# Patient Record
Sex: FEMALE | Race: WHITE | Employment: OTHER | URBAN - METROPOLITAN AREA
[De-identification: names, ages, dates, MRNs, and addresses within clinical notes are randomized per-mention and may not be internally consistent; named-entity substitution may affect disease eponyms.]

---

## 2018-06-20 ENCOUNTER — HOSPITAL ENCOUNTER (OUTPATIENT)
Dept: OCCUPATIONAL THERAPY | Age: 73
Setting detail: THERAPIES SERIES
Discharge: HOME | End: 2018-06-20
Payer: MEDICARE

## 2018-06-20 PROCEDURE — 97110 THERAPEUTIC EXERCISES: CPT | Performed by: OCCUPATIONAL THERAPIST

## 2018-06-20 PROCEDURE — 97140 MANUAL THERAPY 1/> REGIONS: CPT | Performed by: OCCUPATIONAL THERAPIST

## 2018-06-20 PROCEDURE — 97165 OT EVAL LOW COMPLEX 30 MIN: CPT | Performed by: OCCUPATIONAL THERAPIST

## 2018-06-20 PROCEDURE — G8984 CARRY CURRENT STATUS: HCPCS | Performed by: OCCUPATIONAL THERAPIST

## 2018-06-20 PROCEDURE — G8985 CARRY GOAL STATUS: HCPCS | Performed by: OCCUPATIONAL THERAPIST

## 2018-06-25 ENCOUNTER — HOSPITAL ENCOUNTER (OUTPATIENT)
Dept: OCCUPATIONAL THERAPY | Age: 73
Setting detail: THERAPIES SERIES
Discharge: HOME | End: 2018-06-25
Payer: MEDICARE

## 2018-06-25 PROCEDURE — 97110 THERAPEUTIC EXERCISES: CPT

## 2018-06-25 PROCEDURE — 97022 WHIRLPOOL THERAPY: CPT

## 2018-06-25 PROCEDURE — 97140 MANUAL THERAPY 1/> REGIONS: CPT

## 2018-06-25 PROCEDURE — 97760 ORTHOTIC MGMT&TRAING 1ST ENC: CPT

## 2018-06-27 ENCOUNTER — HOSPITAL ENCOUNTER (OUTPATIENT)
Dept: OCCUPATIONAL THERAPY | Age: 73
Setting detail: THERAPIES SERIES
Discharge: HOME | End: 2018-06-27
Payer: MEDICARE

## 2018-07-02 ENCOUNTER — HOSPITAL ENCOUNTER (OUTPATIENT)
Dept: OCCUPATIONAL THERAPY | Age: 73
Setting detail: THERAPIES SERIES
Discharge: HOME | End: 2018-07-02
Payer: MEDICARE

## 2018-07-02 PROCEDURE — 97530 THERAPEUTIC ACTIVITIES: CPT

## 2018-07-02 PROCEDURE — 97140 MANUAL THERAPY 1/> REGIONS: CPT

## 2018-07-02 PROCEDURE — 97110 THERAPEUTIC EXERCISES: CPT

## 2018-07-02 PROCEDURE — 97022 WHIRLPOOL THERAPY: CPT

## 2018-07-02 NOTE — PROGRESS NOTES
OCCUPATIONAL THERAPY PROGRESS NOTE      [facility] [facility]   Phone: [phone]   Fax: [phone]     Date:  2018  Initial Evaluation Date: 2018    Patient Name:  Elly Qureshi    :  1945    Restrictions/Precautions:  Per protocol, low fall risk  Diagnosis:  RUE Cmc arthroplasty                                                              Insurance/Certification information:  Medical mutual  Referring Physician:  Dr. Wyman Najjar  Date of Surgery/Injury: may 29th  Plan of care signed (Y/N):  no  Visit# / total visits: -    Pain Level: mild, aching    Subjective: \"I seem to have bruising from this splint. When does it come off. \"  Discussed home duties. Apparently she continues to do housekeeping chores and possibly working against the splint. She states she does a lot for her family. Objective:  Updated POC to be completed by 10th visit. Reassessment of splint fit done. Splint adjustment completed again due to complaints of pain to improve fit and comfort. INTERVENTION: COMPLETED REPS/TIME: SPECIFICS/COMMENTS:   Modality:      Fluidotherapy x 15 min    Paraffin      MHP      Estim      Manual Therapy:      Scar Massage x     Soft Tissue: x 10 min    Therapeutic Ex/Activities: Towel Task x     Prayer stretch x  Added to hep   AROM x  Per handout and adapted for HEP                                                                           Other:      Splint fit/fabricate x 20 min    Kinesio Tap x  Tape to incision area for pain and edema and scar reformation with wear/care instructions provided. Assessment: Pt is making Fair + progress toward stated plan of care.    -Rehab Potential: Good  -Requires OT Follow Up: Yes  -Time In: 10  -Time Out: 1110   Timed Treatment Minutes: 60 Minutes  Goals: Goals for pt can be see on initial eval occurring on 18  1) Patient will demonstrate good understanding of home

## 2018-07-03 ENCOUNTER — HOSPITAL ENCOUNTER (OUTPATIENT)
Dept: OCCUPATIONAL THERAPY | Age: 73
Setting detail: THERAPIES SERIES
Discharge: HOME | End: 2018-07-03
Payer: MEDICARE

## 2018-07-09 ENCOUNTER — HOSPITAL ENCOUNTER (OUTPATIENT)
Dept: OCCUPATIONAL THERAPY | Age: 73
Setting detail: THERAPIES SERIES
Discharge: HOME | End: 2018-07-09
Payer: MEDICARE

## 2018-07-09 PROCEDURE — 97530 THERAPEUTIC ACTIVITIES: CPT

## 2018-07-09 PROCEDURE — 97110 THERAPEUTIC EXERCISES: CPT

## 2018-07-09 PROCEDURE — 97140 MANUAL THERAPY 1/> REGIONS: CPT

## 2018-07-09 PROCEDURE — 97022 WHIRLPOOL THERAPY: CPT

## 2018-07-09 NOTE — PROGRESS NOTES
understanding of home program(exercises/activities/diagnosis/prognosis/goals) with good accuracy. 2) Patient will demonstrate increased active/passive range of motion of their R hand and wrist to [facility] for ADL/IADL completion. 3) Patient will demonstrate increased /pinch strength of at least 10 / 5 pinch pounds of their R hand, ( protocol permitting)  4) Patient to report decreased pain in their affected R hand from 3-5/10 to 0-2/10 or less with resistive functional use. 5) Patient to report 100% compliance with their splint wear, care, and precautions if needed. 6) Patient will be knowledgeable of edema control techniques as evident with decreases from mild to none. 7) Patient will demonstrate a non-tender/non-adherent scar and good tissue mobility. 8) Patient will report ADL functions same as prior to diagnosis of cmc arthroplasty.     OT G-codes:  Carrying, Handling, Moving objects   (Current status): Rin Gore (Discharge Goal): CI    Plan:   [x]  Continues Plan of care: Treatment delivered based on POC and graduated to patient's progress. Patient education continues at each visit to obtain maximum benefit from skilled OT intervention.   []  Alter Plan of care:   []  Discharge:    ASHLEY Bruno/SHAMEKA, [id number]

## 2018-07-11 ENCOUNTER — HOSPITAL ENCOUNTER (OUTPATIENT)
Dept: OCCUPATIONAL THERAPY | Age: 73
Setting detail: THERAPIES SERIES
Discharge: HOME | End: 2018-07-11
Payer: MEDICARE

## 2018-07-11 PROCEDURE — 97124 MASSAGE THERAPY: CPT

## 2018-07-11 PROCEDURE — G8984 CARRY CURRENT STATUS: HCPCS | Performed by: OCCUPATIONAL THERAPIST

## 2018-07-11 PROCEDURE — G8986 CARRY D/C STATUS: HCPCS | Performed by: OCCUPATIONAL THERAPIST

## 2018-07-11 PROCEDURE — 97110 THERAPEUTIC EXERCISES: CPT

## 2018-07-11 PROCEDURE — 97530 THERAPEUTIC ACTIVITIES: CPT

## 2018-07-11 PROCEDURE — 97022 WHIRLPOOL THERAPY: CPT

## 2018-07-11 PROCEDURE — G8985 CARRY GOAL STATUS: HCPCS | Performed by: OCCUPATIONAL THERAPIST

## 2018-07-11 NOTE — PROGRESS NOTES
OCCUPATIONAL THERAPY PROGRESS NOTE  DISCHARGE SUMMARY      [facility]   Phone: [phone]   Fax: [phone]     Date:  2018  Initial Evaluation Date: 2018    Patient Name:  Gisel Pruitt    :  1945    Restrictions/Precautions:  Per protocol, low fall risk, WBAT RUE(2018)  Diagnosis:  RUE Cmc arthroplasty                                                              Insurance/Certification information:  Medical mutual  Referring Physician:  Dr. Colbert So  Date of Surgery/Injury: may 29th  Plan of care signed (Y/N):  no  Visit# / total visits: -    Pain Level: none    Subjective:   Patient is requesting discharge due to back surgery next week. She has seen physician of record and he is in agreement with this as long as she has an established HEP. Objective:  Established a strengthening and stretching program for home use with handouts. Splint is now PRN if needed. She is aware of her precautions. She is now WBAT on her RUE. Reassessment completed. INTERVENTION: COMPLETED REPS/TIME: SPECIFICS/COMMENTS:   Modality:      Fluidotherapy x 15 min    Paraffin      MHP      Estim      Manual Therapy:      Scar Massage x     Soft Tissue: x 10 min    Therapeutic Ex/Activities: Towel Task x     Prayer stretch x  Added to hep   AROM x  Per handout and adapted for HEP   Putty   10 min    FM TASK x 10 min Deb, towel roll, nuts/bolts   Strengthening x putty Provided for HEP with handouts                                                   HEP x 20 min    Other:      Splint fit/fabricate      Kinesio Tap x  Tape to incision area for pain and edema and scar reformation with wear/care instructions provided. Assessment: Pt is making Fair + progress toward stated plan of care.    -Rehab Potential: Good  -Requires OT Follow Up: Yes  -Time In: 10  -Time Out: 1115   Timed Treatment Minutes: 75  Minutes  Goals: Goals for pt

## 2019-12-30 ENCOUNTER — HOSPITAL ENCOUNTER (OUTPATIENT)
Dept: OCCUPATIONAL THERAPY | Age: 74
Setting detail: THERAPIES SERIES
Discharge: HOME | End: 2019-12-30
Payer: MEDICARE

## 2019-12-30 PROCEDURE — 97110 THERAPEUTIC EXERCISES: CPT | Performed by: OCCUPATIONAL THERAPIST

## 2019-12-30 PROCEDURE — 97140 MANUAL THERAPY 1/> REGIONS: CPT | Performed by: OCCUPATIONAL THERAPIST

## 2019-12-30 PROCEDURE — 97165 OT EVAL LOW COMPLEX 30 MIN: CPT | Performed by: OCCUPATIONAL THERAPIST

## 2020-01-06 ENCOUNTER — HOSPITAL ENCOUNTER (OUTPATIENT)
Dept: OCCUPATIONAL THERAPY | Age: 75
Setting detail: THERAPIES SERIES
Discharge: HOME | End: 2020-01-06
Payer: MEDICARE

## 2020-01-06 PROCEDURE — 97022 WHIRLPOOL THERAPY: CPT

## 2020-01-06 PROCEDURE — 97530 THERAPEUTIC ACTIVITIES: CPT

## 2020-01-06 PROCEDURE — 97140 MANUAL THERAPY 1/> REGIONS: CPT

## 2020-01-06 NOTE — PROGRESS NOTES
[facility] THERAPY PROGRESS NOTE    [facility] [address] [facility]   Phone: [phone]   Fax: [phone]     Date:  2020 Initial Evaluation Date: 2019  Evaluating Therapist: Serafin Gardiner    Patient Name:  Chris Maria    :  1945  Restrictions/Precautions:  Thumb CMC Arthroplasty-4 weeks post-op, low fall risk  Diagnosis:  L [facility] [place] Arthroplasty                                                          Insurance/Certification information: Medicare Medical [place]  Referring Practitioner:  Kathy Giordano MD  Date of Surgery/Injury: 2019  Plan of care signed (Y/N):  N  Visit# / total visits:  if needed. Reassessment:    Pain Level: mild, aching and tight (pulling)    Subjective: \"I feel this one is different than the last surgery. Why does this one look different. \" Reassured patient that we are on track. If any questions regarding surgery she should ask Dr. Craig Rudolph. Objective:  Engaged patient in the following with focus on edema control, scar management, light AROM per protocol. Reassessment at or by 10th visit.    INTERVENTION: CPT Code Time Start - Time Stop COMPLETED REPS/TIME: SPECIFICS/COMMENTS:   Modality:          Fluidotherapy 82983 3:30 - 3:45 x 15 min With SROM as tolerated   Paraffin         MHP         Estim         Manual Therapy: 99673        Scar Massage  3:46 - 3:58 x 12 min To incision with patient education   Soft Tissue:  3:58 - 4:10 x 12 min Cross friction, MFR to area   PROM/AAROM:                                             Therapeutic Exercises:   34491        AROM                 STRETCHING  4:18-4:27 x 9 min Prayers as tolerated   STRENGTHENING                 Therapeutic Activity: 50717       Towel Task  4:10-4:18 x 10 reps Functional endurance tolerance of forearm and digits excluding thumb                                    Other:                                          Assessment: Patient shows potential to progress with stated goals and will be educated on HEP and provided written handouts as needed throughout their care. Pt is making Fair progress toward stated plan of care. -Rehab Potential: Good  -Requires OT Follow Up: Yes  Goal Progress:   1) Patient will demonstrate good understanding of home program (exercises/activities/diagnosis/prognosis/goals) with good accuracy. 2) Patient will demonstrate increased active/passive range of motion of their Left thumb to [facility] for proper ADL/IADL completion. 3) Patient will demonstrate increased /pinch strength of at least 10 / 5 pinch pounds of their L hand. 4) Increase in fine motor function as evidenced by decreased time to complete 9-hole peg test and/or MRMT test by at least 10 seconds. 5) Patient to report 100% compliance with their splint wear, care, and precautions if needed. 6) Patient to demonstrate decreased guarding of their affected extremity from 60% to 10% or less. 7) Patient will be knowledgeable of edema control techniques as evident with decreases from minimal to none. 8) Patient will demonstrate a non-tender/non-adherent scar. 9) Patient will report ADL functions same as prior to diagnosis of L CMC arthroplasty. 10) Patient will demonstrate increased active/passive range of motion of their Left wrist to [facility] for proper ADL/IADL completion.       Plan: Frequency/Duration 1-2 / week for 8-52 ZTHTGY if needed.    Certification period From: 12/30/2019  To: 3/16/2020    [x]  Continues Plan of care: Treatment delivered based on POC and graduated to patient's progress. Patient education continues at each visit to obtain maximum benefit from skilled OT intervention.   []  Alter Plan of care:   []  Discharge:    Billing:     TIME IN: 330   TIME OUT: 430 TOTAL TREATMENT TIME: 58 TOTAL TIME: 60    CODE  Time In Time Out TODAY MINUTES TODAY UNITS  TOTAL USED TOTAL ALLOWED   02218 Manual   24 2      21724 Therapeutic Ex   9 1

## 2020-01-08 ENCOUNTER — HOSPITAL ENCOUNTER (OUTPATIENT)
Dept: OCCUPATIONAL THERAPY | Age: 75
Setting detail: THERAPIES SERIES
Discharge: HOME | End: 2020-01-08
Payer: MEDICARE

## 2020-01-08 PROCEDURE — 97110 THERAPEUTIC EXERCISES: CPT

## 2020-01-08 PROCEDURE — 97530 THERAPEUTIC ACTIVITIES: CPT

## 2020-01-08 PROCEDURE — 97140 MANUAL THERAPY 1/> REGIONS: CPT

## 2020-01-08 PROCEDURE — 97022 WHIRLPOOL THERAPY: CPT

## 2020-01-08 NOTE — PROGRESS NOTES
[facility] THERAPY PROGRESS NOTE    [facility] [address] [facility]   Phone: [phone]   Fax: [phone]     Date:  2020 Initial Evaluation Date: 2019  Evaluating Therapist: Dwain Giordano    Patient Name:  Olinda Saint    :  1945  Restrictions/Precautions:  Thumb CMC Arthroplasty-4 weeks post-op, low fall risk  Diagnosis:  L [facility] [place] Arthroplasty                                                          Insurance/Certification information: Medicare Medical [place]  Referring Practitioner:  Michelle Peterson MD  Date of Surgery/Injury: 2019  Plan of care signed (Y/N):  N  Visit# / total visits: 3 / 18 if needed. Reassessment:    Pain Level: mild, aching and tight (pulling)    Subjective: \"I had a little bit more pain HS the day of my last session. \"  HEP in place and graded as needed. Objective:  Engaged patient in the following with focus on edema control, scar management, light AROM per protocol. Reassessment at or by 10th visit.    INTERVENTION: CPT Code Time Start - Time Stop COMPLETED REPS/TIME: SPECIFICS/COMMENTS:   Modality:          Fluidotherapy 93728 3:31 - 3:44 x 15 min With SROM as tolerated   Paraffin         MHP         Estim         Manual Therapy: 47401        Scar Massage  3:46 - 3:58 x 12 min To incision with patient education   Soft Tissue:  3:58 - 4:10 x 12 min Cross friction, MFR to area   PROM/AAROM:                                             Therapeutic Exercises:   59939        AROM                 STRETCHING  4:18-4:27 x 9 min Prayers as tolerated   Stretching  \" x \" included Web space           Therapeutic Activity: 75599       Towel Task  4:10-4:18 x 10 reps Functional endurance tolerance of forearm and digits excluding thumb                                    Other:                                          Assessment: Patient shows potential to progress with stated goals and will be educated on HEP and 39490 ADL/COMP Tech Train          45519 Neuromuscular Re-Ed          72949 OrthoManagementTraining           Modality:           Other                                           TOTAL    [address],  RAM[place]/L, [id number]

## 2020-01-13 ENCOUNTER — HOSPITAL ENCOUNTER (OUTPATIENT)
Dept: OCCUPATIONAL THERAPY | Age: 75
Setting detail: THERAPIES SERIES
Discharge: HOME | End: 2020-01-13
Payer: MEDICARE

## 2020-01-13 PROCEDURE — 97140 MANUAL THERAPY 1/> REGIONS: CPT

## 2020-01-13 PROCEDURE — 97022 WHIRLPOOL THERAPY: CPT

## 2020-01-13 PROCEDURE — 97530 THERAPEUTIC ACTIVITIES: CPT

## 2020-01-13 PROCEDURE — 97110 THERAPEUTIC EXERCISES: CPT

## 2020-01-13 NOTE — PROGRESS NOTES
[facility] THERAPY PROGRESS NOTE    [address] [place] [address]   Phone: [phone]   Fax: [phone]     Date:  2020 Initial Evaluation Date: 2019  Evaluating Therapist: Ursula Holter    Patient Name:  Francisco Javier Norton    :  1945  Restrictions/Precautions:  Thumb CMC Arthroplasty-4 weeks post-op, low fall risk  Diagnosis:  L [facility] [place] Arthroplasty                                                          Insurance/Certification information: Medicare Medical [place]  Referring Practitioner:  Arabella Medina MD  Date of Surgery/Injury: 2019  Plan of care signed (Y/N):  N  Visit# / total visits:  if needed. Reassessment:    Pain Level: mild, aching and tight (pulling)    Subjective:  Seen 1/2 scheduled visits. Little pain voiced when attempting to flatten hand. Able to carry groceries this week. Next physician's appointment is 1/15/2020. Objective:  Engaged patient in the following with focus on edema control, scar management, light AROM per protocol. Reassessment at or by 10th visit.    INTERVENTION: CPT Code Time Start - Time Stop COMPLETED REPS/TIME: SPECIFICS/COMMENTS:   Modality:          Fluidotherapy 78599 3:31 - 3:44 x 15 min With SROM as tolerated   Paraffin         MHP         Estim         Manual Therapy: 43924        Scar Massage  3:46 - 3:54 x 8 min To incision with patient education   Soft Tissue:  3:58 - 4:10 x 12 min Cross friction, MFR to area   PROM/AAROM:                                             Therapeutic Exercises:   55328        AROM                 STRETCHING  4:18-4:27 x 9 min Prayers as tolerated   Stretching  \" x \" included Web space           Therapeutic Activity: 45331       Towel Task  4:10-4:18 x 10 reps Functional endurance tolerance of forearm and digits excluding thumb                                    Other:                                          Assessment: Patient shows potential

## 2020-01-17 ENCOUNTER — HOSPITAL ENCOUNTER (OUTPATIENT)
Dept: OCCUPATIONAL THERAPY | Age: 75
Setting detail: THERAPIES SERIES
Discharge: HOME | End: 2020-01-17
Payer: MEDICARE

## 2020-01-17 PROCEDURE — 97140 MANUAL THERAPY 1/> REGIONS: CPT

## 2020-01-17 PROCEDURE — 97110 THERAPEUTIC EXERCISES: CPT

## 2020-01-17 PROCEDURE — 97022 WHIRLPOOL THERAPY: CPT

## 2020-01-17 PROCEDURE — 97530 THERAPEUTIC ACTIVITIES: CPT

## 2020-01-17 NOTE — PROGRESS NOTES
[facility] THERAPY PROGRESS NOTE    [address] [address] [place] [address]   Phone: [phone]   Fax: [phone]     Date:  2020 Initial Evaluation Date: 2019  Evaluating Therapist: Brady Bosworth    Patient Name:  Trinh Lombardo    :  1945  Restrictions/Precautions:  Thumb CMC Arthroplasty-4 weeks post-op, low fall risk  Diagnosis:  L [facility] [place] Arthroplasty                                                          Insurance/Certification information: Medicare Medical [place]  Referring Practitioner:  Sarah Brower MD  Date of Surgery/Injury: 2019  Plan of care signed (Y/N):  N  Visit# / total visits:  if needed. Reassessment:    Pain Level: mild, aching and tight (pulling)    Subjective:  Seen 2/2 scheduled visits. Little pain voiced when attempting to flatten hand. Able to carry groceries this week. Next physician's appointment is 1/15/2020. Objective:  Engaged patient in the following with focus on edema control, scar management, light AROM per protocol. Reassessment at or by 10th visit.    INTERVENTION: CPT Code Time Start - Time Stop COMPLETED REPS/TIME: SPECIFICS/COMMENTS:   Modality:          Fluidotherapy 10651 9:31 - 9:44 x 15 min With SROM as tolerated   Paraffin         MHP         Estim         Manual Therapy: 74096        Scar Massage  9:46 - 9:54 x 8 min To incision with patient education   Soft Tissue:  9:58 - 9:10 x 12 min Cross friction, MFR to area   PROM/AAROM:                                             Therapeutic Exercises:   51524        AROM                 STRETCHING  1027 - 1031 x 4 Prayers as tolerated   Stretching  \" x \" included Web space           Therapeutic Activity: 91221       Towel Task  10:10- 10:15 x 10 reps Functional endurance tolerance of forearm and digits excluding thumb   Deb pickup and hold  10 15 - 10 27 x                              Other:        Splint adjustment Assessment: Patient shows potential to progress with stated goals and will be educated on HEP and provided written handouts as needed throughout their care. Pt is making Fair progress toward stated plan of care. -Rehab Potential: Good  -Requires OT Follow Up: Yes  Goal Progress:   1) Patient will demonstrate good understanding of home program (exercises/activities/diagnosis/prognosis/goals) with good accuracy. 2) Patient will demonstrate increased active/passive range of motion of their Left thumb to [facility] for proper ADL/IADL completion. 3) Patient will demonstrate increased /pinch strength of at least 10 / 5 pinch pounds of their L hand. 4) Increase in fine motor function as evidenced by decreased time to complete 9-hole peg test and/or MRMT test by at least 10 seconds. 5) Patient to report 100% compliance with their splint wear, care, and precautions if needed. 6) Patient to demonstrate decreased guarding of their affected extremity from 60% to 10% or less. 7) Patient will be knowledgeable of edema control techniques as evident with decreases from minimal to none. 8) Patient will demonstrate a non-tender/non-adherent scar. 9) Patient will report ADL functions same as prior to diagnosis of L CMC arthroplasty. 10) Patient will demonstrate increased active/passive range of motion of their Left wrist to [facility] for proper ADL/IADL completion.     Plan: Frequency/Duration 1-2 / week for 2-56 TRYGGA if needed.    Certification period From: 12/30/2019  To: 3/16/2020    [x]  Continues Plan of care: Treatment delivered based on POC and graduated to patient's progress. Patient education continues at each visit to obtain maximum benefit from skilled OT intervention.   []  Alter Plan of care:   []  Discharge:    Billing:     TIME IN: 930   TIME OUT: 1035 TOTAL TREATMENT TIME: 60 TOTAL TIME: 65    CODE  Time In Time Out 100 Lisa Saldaña Manual   15 1      22674 Therapeutic Ex   10 1      58211 Therapeutic Activity   20 1      35312 Fluidotherapy   15 1      63806 ADL/COMP Tech Train          [id number] Neuromuscular Re-Ed          33382 OrthoManagementTraining           Modality:           Other                                           TOTAL    60 [address],  [place]/L, [address]

## 2020-01-20 ENCOUNTER — HOSPITAL ENCOUNTER (OUTPATIENT)
Dept: OCCUPATIONAL THERAPY | Age: 75
Setting detail: THERAPIES SERIES
Discharge: HOME | End: 2020-01-20
Payer: MEDICARE

## 2020-01-20 PROCEDURE — 97110 THERAPEUTIC EXERCISES: CPT

## 2020-01-20 PROCEDURE — 97022 WHIRLPOOL THERAPY: CPT

## 2020-01-20 PROCEDURE — 97140 MANUAL THERAPY 1/> REGIONS: CPT

## 2020-01-20 NOTE — PROGRESS NOTES
[facility] THERAPY PROGRESS NOTE    [facility] [address] [facility]   Phone: [phone]   Fax: [phone]     Date:  2020 Initial Evaluation Date: 2019  Evaluating Therapist: Junior Bello    Patient Name:  Alan Leigh    :  1945  Restrictions/Precautions:  Thumb CMC Arthroplasty-4 weeks post-op, low fall risk  Diagnosis:  L [facility] Arthroplasty                                                          Insurance/Certification information: Medicare Medical [place]  Referring Practitioner:  Geovany Lang MD  Date of Surgery/Injury: 2019  Plan of care signed (Y/N):  N  Visit# / total visits:  if needed. Reassessment:    Pain Level: mild, aching and tight (pulling)    Subjective:  Seen 1/2 scheduled visits. WEEK 7 of protocol Little pain voiced when attempting to flatten hand. Able to carry groceries this week. Next physician's appointment is 2020. Objective:  Engaged patient in the following with focus on edema control, scar management, light AROM per protocol. Reassessment at or by 10th visit. Week 7:  Gentle AROM, PROM TO CMC, Scar Mgmt, NON Weight Bearing, Use comfort cool brace discharge hard brace.     INTERVENTION: CPT Code Time Start - Time Stop COMPLETED REPS/TIME: SPECIFICS/COMMENTS:   Modality:          Fluidotherapy 97022  x 15 min With SROM as tolerated   Paraffin         MHP         Estim         Manual Therapy: 60069        Scar Massage   x 8 min To incision with patient education   Soft Tissue:   x 12 min Cross friction, MFR to area   PROM/AAROM:         PROM to [facility]   x 7 min     AROM to IP, CMC, MP   x 8 min                       Therapeutic Exercises:   92382        AROM                 STRETCHING   x 5 min Prayers as tolerated   Stretching   x \"   included Web space           Therapeutic Activity: 40939       Towel Task   x 4 min Functional endurance tolerance of forearm and digits excluding thumb   Deb pickup and hold   x 10 min    [employer] ball   x 6 min                     Other:        Splint adjustment                                  Assessment: Patient shows potential to progress with stated goals and will be educated on HEP and provided written handouts as needed throughout their care. Pt is making Fair progress toward stated plan of care. -Rehab Potential: Good  -Requires OT Follow Up: Yes  Goal Progress:   1) Patient will demonstrate good understanding of home program (exercises/activities/diagnosis/prognosis/goals) with good accuracy. 2) Patient will demonstrate increased active/passive range of motion of their Left thumb to [facility] for proper ADL/IADL completion. 3) Patient will demonstrate increased /pinch strength of at least 10 / 5 pinch pounds of their L hand. 4) Increase in fine motor function as evidenced by decreased time to complete 9-hole peg test and/or MRMT test by at least 10 seconds. 5) Patient to report 100% compliance with their splint wear, care, and precautions if needed. 6) Patient to demonstrate decreased guarding of their affected extremity from 60% to 10% or less. 7) Patient will be knowledgeable of edema control techniques as evident with decreases from minimal to none. 8) Patient will demonstrate a non-tender/non-adherent scar. 9) Patient will report ADL functions same as prior to diagnosis of L CMC arthroplasty. 10) Patient will demonstrate increased active/passive range of motion of their Left wrist to [facility] for proper ADL/IADL completion.     Plan: Frequency/Duration 1-2 / week for 0-44 IXSVAP if needed.    Certification period From: 12/30/2019  To: 3/16/2020    [x]  Continues Plan of care: Treatment delivered based on POC and graduated to patient's progress. Patient education continues at each visit to obtain maximum benefit from skilled OT intervention.   []  Alter Plan of care:   []  Discharge:    Billing:     TIME IN: 934   TBZN OUT: 1035 TOTAL TREATMENT TIME: 60 TOTAL TIME: 65    CODE  Time In Time Out TODAY MINUTES TODAY UNITS  TOTAL USED TOTAL ALLOWED   79727 Manual   35 2      46248 Therapeutic Ex          13622 Therapeutic Activity   15 1      62596 Fluidotherapy   10 1      31013 ADL/COMP Tech Train          48238 Neuromuscular Re-Ed          14886 OrthoManagementTraining           Modality:           Other                                           TOTAL    60 [address] [facility],  RAM[place]/L, [id number]

## 2020-01-22 ENCOUNTER — HOSPITAL ENCOUNTER (OUTPATIENT)
Dept: OCCUPATIONAL THERAPY | Age: 75
Setting detail: THERAPIES SERIES
Discharge: HOME | End: 2020-01-22
Payer: MEDICARE

## 2020-01-22 PROCEDURE — 97530 THERAPEUTIC ACTIVITIES: CPT

## 2020-01-22 PROCEDURE — 97022 WHIRLPOOL THERAPY: CPT

## 2020-01-22 PROCEDURE — 97140 MANUAL THERAPY 1/> REGIONS: CPT

## 2020-01-22 NOTE — PROGRESS NOTES
Functional endurance tolerance of forearm and digits excluding thumb   Cotton Ball Manipulation with \"O\" positioning of thumb and index   x 15    Colton Whall ball   x 6 min                     Other:        Splint adjustment                                  Assessment: Patient shows potential to progress with stated goals and will be educated on HEP and provided written handouts as needed throughout their care. Pt is making Fair progress toward stated plan of care. -Rehab Potential: Good  -Requires OT Follow Up: Yes  Goal Progress:   1) Patient will demonstrate good understanding of home program (exercises/activities/diagnosis/prognosis/goals) with good accuracy. 2) Patient will demonstrate increased active/passive range of motion of their Left thumb to [facility] for proper ADL/IADL completion. 3) Patient will demonstrate increased /pinch strength of at least 10 / 5 pinch pounds of their L hand. 4) Increase in fine motor function as evidenced by decreased time to complete 9-hole peg test and/or MRMT test by at least 10 seconds. 5) Patient to report 100% compliance with their splint wear, care, and precautions if needed. 6) Patient to demonstrate decreased guarding of their affected extremity from 60% to 10% or less. 7) Patient will be knowledgeable of edema control techniques as evident with decreases from minimal to none. 8) Patient will demonstrate a non-tender/non-adherent scar. 9) Patient will report ADL functions same as prior to diagnosis of L CMC arthroplasty. 10) Patient will demonstrate increased active/passive range of motion of their Left wrist to [facility] for proper ADL/IADL completion.     Plan: Frequency/Duration 1-2 / week for 0-46 WDVVIR if needed.    Certification period From: 12/30/2019  To: 3/16/2020    [x]  Continues Plan of care: Treatment delivered based on POC and graduated to patient's progress.   Patient education continues at each visit to obtain maximum benefit from skilled OT intervention.   []  Alter Plan of care:   []  Discharge:    Billing:     TIME IN: [address]: 1035 TOTAL TREATMENT TIME: 60 TOTAL TIME: 65    CODE  Time In Time Out TODAY MINUTES TODAY UNITS  TOTAL USED TOTAL ALLOWED   59614 Manual   20 1      55832 Therapeutic Ex          21836 Therapeutic Activity   25 2      99676 Fluidotherapy   15 1      90106 ADL/COMP Tech Train          73091 Neuromuscular Re-Ed          27454 OrthoManagementTraining           Modality:           Other                                           TOTAL    60 1291 [facility] [address],  R Laura Bocanegra , [id number]

## 2020-01-27 ENCOUNTER — HOSPITAL ENCOUNTER (OUTPATIENT)
Dept: OCCUPATIONAL THERAPY | Age: 75
Setting detail: THERAPIES SERIES
Discharge: HOME | End: 2020-01-27
Payer: MEDICARE

## 2020-01-27 PROCEDURE — 97140 MANUAL THERAPY 1/> REGIONS: CPT

## 2020-01-27 PROCEDURE — 97110 THERAPEUTIC EXERCISES: CPT

## 2020-01-27 PROCEDURE — 97530 THERAPEUTIC ACTIVITIES: CPT

## 2020-01-27 PROCEDURE — 97022 WHIRLPOOL THERAPY: CPT

## 2020-01-27 NOTE — PROGRESS NOTES
STRETCHING   x 5 min Prayers as tolerated   Stretching   x \"   included Web space   Thumb strengthening   x 10 min Initiated with light putty pinch in 'O\" position   Therapeutic Activity: 49587       Towel Task   x 4 min Functional endurance tolerance of forearm and digits excluding thumb   Cotton Ball Manipulation with \"O\" positioning of thumb and index        Colton Whall ball   x 6 min    TIP to TIP pinch task   x 7min Maintaining 'O' position with multiple  and manipulate of small graded objects in hand over 7 min            Other:        Splint adjustment                                  Assessment: Patient shows potential to progress with stated goals and will be educated on HEP and provided written handouts as needed throughout their care. Pt is making Fair progress toward stated plan of care. -Rehab Potential: Good  -Requires OT Follow Up: Yes  Goal Progress:   1) Patient will demonstrate good understanding of home program (exercises/activities/diagnosis/prognosis/goals) with good accuracy. 2) Patient will demonstrate increased active/passive range of motion of their Left thumb to [facility] for proper ADL/IADL completion. 3) Patient will demonstrate increased /pinch strength of at least 10 / 5 pinch pounds of their L hand. 4) Increase in fine motor function as evidenced by decreased time to complete 9-hole peg test and/or MRMT test by at least 10 seconds. 5) Patient to report 100% compliance with their splint wear, care, and precautions if needed. 6) Patient to demonstrate decreased guarding of their affected extremity from 60% to 10% or less. 7) Patient will be knowledgeable of edema control techniques as evident with decreases from minimal to none. 8) Patient will demonstrate a non-tender/non-adherent scar. 9) Patient will report ADL functions same as prior to diagnosis of L CMC arthroplasty.   10) Patient will demonstrate increased active/passive range of motion of their Left wrist to [facility] for proper ADL/IADL completion.     Plan: Frequency/Duration 1-2 / week for 7-78 [id number] if needed.    Certification period From: 12/30/2019  To: 3/16/2020    [x]  Continues Plan of care: Treatment delivered based on POC and graduated to patient's progress. Patient education continues at each visit to obtain maximum benefit from skilled OT intervention.   []  Alter Plan of care:   []  Discharge:    Billing:     TIME IN: [address]: 1035 TOTAL TREATMENT TIME: 60 TOTAL TIME: 65    CODE  Time In Time Out TODAY MINUTES TODAY UNITS  TOTAL USED TOTAL ALLOWED   05135 Manual   13 1      21686 Therapeutic Ex   15 1      20724 Therapeutic Activity   17 1      64539 Fluidotherapy   15 1      48412 ADL/COMP Tech Train          86521 Neuromuscular Re-Ed          85016 OrthoManagementTraining           Modality:           Other                                           TOTAL    60 1291 [facility] [address]  R Laura [address]oc[address], [id number]

## 2020-01-29 ENCOUNTER — HOSPITAL ENCOUNTER (OUTPATIENT)
Dept: OCCUPATIONAL THERAPY | Age: 75
Setting detail: THERAPIES SERIES
Discharge: HOME | End: 2020-01-29
Payer: MEDICARE

## 2020-01-29 PROCEDURE — 97110 THERAPEUTIC EXERCISES: CPT

## 2020-01-29 PROCEDURE — 97022 WHIRLPOOL THERAPY: CPT

## 2020-01-29 PROCEDURE — 97140 MANUAL THERAPY 1/> REGIONS: CPT

## 2020-01-29 PROCEDURE — 97530 THERAPEUTIC ACTIVITIES: CPT

## 2020-01-29 NOTE — PROGRESS NOTES
[facility] THERAPY PROGRESS NOTE    [address] [place] [address]   Phone: [phone]   Fax: [phone]     Date:  2020 Initial Evaluation Date: 2019  Evaluating Therapist: Landon Almazan    Patient Name:  Miranda Sabillon    :  1945  Restrictions/Precautions:  Thumb CMC Arthroplasty-4 weeks post-op, low fall risk  Diagnosis:  L [facility] Arthroplasty                                                          Insurance/Certification information: Medicare / Medical [place]  Referring Practitioner:  Daysi Mackey MD  Date of Surgery/Injury: 2019  Plan of care signed (Y/N):    Visit# / total visits: -   Reassessment: DUE   Pain Level: mild, aching and tight (pulling)    Subjective:  Seen 2/2 scheduled visits. WEEK 8 of protocol. CC is limited ROM at thumb. She feels this one is not moving as steadily as the first thumb recovered. Pleased with comfort cool brace. Pain is mild to none at this time. Precautions with lifting and carrying completed with patient voicing understanding. Objective:  Engaged patient in the following with focus on edema control, scar management, light AROM per protocol. Reassessment at or by each 10th visit. Week 8:  Gentle thumb strengthening, Initiate HEP strengthening continue  AROM, PROM TO CMC, Scar Mgmt, NON Weight Bearing, Use comfort cool brace discharge hard brace. No ADLs with affected thumb at this time(week 10).   INTERVENTION: CPT Code COMPLETED REPS/TIME: SPECIFICS/COMMENTS:   Modality:         Fluidotherapy 97022 x 15 min With SROM as tolerated   Paraffin        MHP        Estim        Manual Therapy: 27821       Scar Massage  x 8 min To incision with patient education   Soft Tissue:  x 8 min Cross friction, MFR to area   PROM/AAROM:        PROM to [facility]  x 4 min                             Therapeutic Exercises:   86478       AROM               STRETCHING  x 5 min Prayers as tolerated   Stretching  x \"   included Web space          Therapeutic Activity: 94624      Towel Task  x 4 min Functional endurance tolerance of forearm and digits excluding thumb   Cotton Ball Manipulation with \"O\" positioning of thumb and index       Colton Whall ball  x 5 min    TIP to TIP pinch task  x 12 min Use of med putty and Maintaining 'O' position with multiple  and manipulate of small graded objects in hand over 7 min           Other:       Splint adjustment                              Assessment: Patient shows potential to progress with stated goals and will be educated on HEP and provided written handouts as needed throughout their care. Pt is making Fair progress toward stated plan of care. -Rehab Potential: Good  -Requires OT Follow Up: Yes  Goal Progress:   1) Patient will demonstrate good understanding of home program (exercises/activities/diagnosis/prognosis/goals) with good accuracy. 2) Patient will demonstrate increased active/passive range of motion of their Left thumb to [facility] for proper ADL/IADL completion. 3) Patient will demonstrate increased /pinch strength of at least 10 / 5 pinch pounds of their L hand. 4) Increase in fine motor function as evidenced by decreased time to complete 9-hole peg test and/or MRMT test by at least 10 seconds. 5) Patient to report 100% compliance with their splint wear, care, and precautions if needed. 6) Patient to demonstrate decreased guarding of their affected extremity from 60% to 10% or less. 7) Patient will be knowledgeable of edema control techniques as evident with decreases from minimal to none. 8) Patient will demonstrate a non-tender/non-adherent scar. 9) Patient will report ADL functions same as prior to diagnosis of L CMC arthroplasty.   10) Patient will demonstrate increased active/passive range of motion of their Left wrist to [facility] for proper ADL/IADL completion.     Plan: Frequency/Duration 1-2 / week for 8-92 [id number] if needed.    Certification period From: 12/30/2019  To: 3/16/2020    [x]  Continues Plan of care: Treatment delivered based on POC and graduated to patient's progress. Patient education continues at each visit to obtain maximum benefit from skilled OT intervention.   []  Alter Plan of care:   []  Discharge:    Billing:     TIME IN: [address] [facility]: 1035 TOTAL TREATMENT TIME: 60 TOTAL TIME: 65    CODE  Time In Time Out TODAY MINUTES TODAY UNITS  TOTAL USED TOTAL ALLOWED   60065 Manual   20 1      89685 Therapeutic Ex   10 1      10555 Therapeutic Activity   15 1      79112 Fluidotherapy   15 1      35943 ADL/COMP Tech Train          85611 Neuromuscular Re-Ed          37473 OrthoManagementTraining           Modality:           Other                                           TOTAL    60 1291 [facility] [address],  R Laura [address]oc[address], [id number]

## 2020-02-03 ENCOUNTER — HOSPITAL ENCOUNTER (OUTPATIENT)
Dept: OCCUPATIONAL THERAPY | Age: 75
Setting detail: THERAPIES SERIES
Discharge: HOME | End: 2020-02-03
Payer: MEDICARE

## 2020-02-03 PROCEDURE — 97530 THERAPEUTIC ACTIVITIES: CPT

## 2020-02-03 PROCEDURE — 97110 THERAPEUTIC EXERCISES: CPT

## 2020-02-03 PROCEDURE — 97140 MANUAL THERAPY 1/> REGIONS: CPT

## 2020-02-03 NOTE — PROGRESS NOTES
[facility] THERAPY PROGRESS NOTE    [address] [place] [address] [facility]   Phone: [phone]   Fax: [phone]     Date:  2/3/2020 Initial Evaluation Date: 2019  Evaluating Therapist: Lakhwinder Kulkarni    Patient Name:  Osmar Quinn    :  1945  Restrictions/Precautions:  Thumb CMC Arthroplasty-4 weeks post-op, low fall risk  Diagnosis:  L [facility] Arthroplasty                                                          Insurance/Certification information: Medicare / Medical [place]  Referring Practitioner:  Bahman Casper MD  Date of Surgery/Injury: 2019  Plan of care signed (Y/N):    Visit# / total visits: 10 / 12-   Reassessment: DUE   Pain Level: mild, aching and tight (pulling)    Subjective:  Seen 1/2 scheduled visits. WEEK 9 of protocol. CC is limited ROM at thumb and an aching feeling that she states she did not have in the prior hand. Pleased with comfort cool brace. Objective:  Engaged patient in the following with focus on edema control, scar management, light AROM per protocol. Reassessment at or by each 10th visit. Week 9:  Continue week 8. Gentle thumb strengthening, Initiate HEP strengthening continue  AROM, PROM TO CMC, Scar Mgmt, NON Weight Bearing, Use comfort cool brace discharge hard brace. No ADLs with affected thumb at this time(week 10).   INTERVENTION: CPT Code COMPLETED REPS/TIME: SPECIFICS/COMMENTS:   Modality:         Fluidotherapy 97022 x 12 min With SROM as tolerated and light isometric pinch   Paraffin        MHP        Estim        Manual Therapy: 38615       Scar Massage   8 min To incision with patient education   Soft Tissue:  x 12 min Cross friction, MFR to area   PROM/AAROM:        PROM to [facility]  x 4 min                             Therapeutic Exercises:   27360       AROM               STRETCHING  x 5 min Prayers as tolerated   Stretching  x \"   included Web space          Therapeutic Activity: 37359

## 2020-02-05 ENCOUNTER — HOSPITAL ENCOUNTER (OUTPATIENT)
Dept: OCCUPATIONAL THERAPY | Age: 75
Setting detail: THERAPIES SERIES
Discharge: HOME | End: 2020-02-05
Payer: MEDICARE

## 2020-02-05 PROCEDURE — 97140 MANUAL THERAPY 1/> REGIONS: CPT

## 2020-02-05 PROCEDURE — 97530 THERAPEUTIC ACTIVITIES: CPT | Performed by: OCCUPATIONAL THERAPIST

## 2020-02-05 PROCEDURE — 97022 WHIRLPOOL THERAPY: CPT

## 2020-02-05 NOTE — PROGRESS NOTES
included Web space          Therapeutic Activity: 12550      Towel Task   4 min Functional endurance tolerance of forearm and digits excluding thumb   Cotton Ball Manipulation with \"O\" positioning of thumb and index       Colton Whall ball   5 min    TIP to TIP pinch task   12 min Use of med putty and Maintaining 'O' position with multiple  and manipulate of small graded objects in hand over 7 min           Other:       Splint adjustment       Kinesio tape    6 min To assist with stretch completed today. Wear care instructions provided. Tape applies to dorsal surface and thumb. Re-Assessment  x 30 min See below. Assessment: Patient shows potential to progress with stated goals and will be educated on HEP and provided written handouts as needed throughout their care. HEP was reviewed. Pt with increased pain on dorsal hand and thumb, may be exacerbated by completing extensive activities at home. Recommended to pt that she rest more or incorporate frequent breaks. Thumb ROM improving well, Wrist ROM improving but slowly. Strength measurements recorded today. Pt reports improved participation in daily activities but has difficulty and pain with use. Pt reports she responded much better to her last surgery on her R hand than this surgery. Re-assessment has been completed and measurements are shown below in BOLD. Left Wrist ROM:   12/30/19 2/5/2020  Forearm Pron 0-90:                            WFL  Forearm Supination  0-90:                  WFL  Wrist Flexion 0-80:                              0-35*  0-37*  Wrist Extension 0-70:               0-30*  0-49*  Wrist Radial Deviation 0-20:               0-10*  0-10*  Wrist Ulnar Deviation 0-45:                 0-30*  0-40*     Left Hand ROM     Thumb MCP 0-50:                              0-35*  0-43*  Thumb IP 0-80:                                   0-32*  0-45*  Thumb Radial ABduction 0-55:           TBA  0-50*  Thumb Palmar ABduction 0-45: TBA  -18*-47*  Thumb Opposition:                             TBA  Full     May initiate gentle strengthening at 8 weeks post-op:  Dynamometer (setting 2):                              Left: TBA  30#                                             Right: TBA  44#                    Pinch Meter:              Lateral: Left= TBA  3.5#,     Right= TBA  8#              Palmar 3 point: Left= TBA  2.5#, Right= TBA  7#  9 Hole Peg Test:              Left: TBA  23 sec              Right: TBA  22 sec    QuickDASH: 47.73% disability    Pt is making Fair progress toward stated plan of care. -Rehab Potential: Good  -Requires OT Follow Up: Yes    Goal Progress:   1) Patient will demonstrate good understanding of home program (exercises/activities/diagnosis/prognosis/goals) with good accuracy. Goal progressing. Pt may be completing too much functional activities at home without taking rest breaks. Pt may be overworking hand at home that is contributing to increased pain. Pt to rest hand. 2) Patient will demonstrate increased active/passive range of motion of their Left thumb to [facility] for proper ADL/IADL completion. Goal progressing well. Measurements shown above. 3) Patient will demonstrate increased /pinch strength of at least 10 / 5 pinch pounds of their L hand. Goal progressing well. Good progress toward  strength with no pain during testing. Slow progress toward pinch strength with mild pain during testing. 4) Increase in fine motor function as evidenced by decreased time to complete 9-hole peg test and/or MRMT test by at least 10 seconds. Goal discharged. 5) Patient to report 100% compliance with their splint wear, care, and precautions if needed. Goal progressing well. Pt now wearing comfort cool brace at all times that she finds pain relief from. 6) Patient to demonstrate decreased guarding of their affected extremity from 60% to 10% or less. Goal Progressing well.  Pt may be using hand too much at

## 2020-02-10 ENCOUNTER — HOSPITAL ENCOUNTER (OUTPATIENT)
Dept: OCCUPATIONAL THERAPY | Age: 75
Setting detail: THERAPIES SERIES
Discharge: HOME | End: 2020-02-10
Payer: MEDICARE

## 2020-02-10 PROCEDURE — 97022 WHIRLPOOL THERAPY: CPT

## 2020-02-10 PROCEDURE — 97110 THERAPEUTIC EXERCISES: CPT

## 2020-02-10 PROCEDURE — 97530 THERAPEUTIC ACTIVITIES: CPT

## 2020-02-10 NOTE — PROGRESS NOTES
[facility] THERAPY PROGRESS NOTE    [address] [place] [address] [facility]   Phone: [phone]   Fax: [phone]     Date:  2/10/2020 Initial Evaluation Date: 2019  Evaluating Therapist: Tara Muniz    Patient Name:  Nikunj Brunson    :  1945  Restrictions/Precautions:  Thumb CMC Arthroplasty-4 weeks post-op, low fall risk  Diagnosis:  L [facility] Arthroplasty                                                          Insurance/Certification information: Medicare / Medical [place]  Referring Practitioner:  Venita Conklin MD  Date of Surgery/Injury: 2019  Plan of care signed (Y/N):  N  Visit# / total visits: -   Reassessment: 2020  S Lara    Pain Level: mild, aching and tight (pulling)    Subjective:  Seen 1/2 scheduled visits. WEEK 10 of protocol. Reports she has worn the brace and taken her usual medication this weekend and did not stress her thumb and she has less pain today. Encouraged continue complaince to program to alleviate pain. Objective: Engaged patient in the following with focus on edema control, scar management, light AROM per protocol. Reassessment at or by each 10th visit. Week 10:  Progressive Strengthening. Continue use of splint when not strengthening. OK to begin use of thumb for ADLs in splint. HEP strengthening continue  AROM, PROM TO CMC, Scar Mgmt, NON Weight Bearing, Use comfort cool brace discharge hard brace. No ADLs with affected thumb at this time(week 10).   INTERVENTION: CPT Code COMPLETED REPS/TIME: SPECIFICS/COMMENTS:   Modality:         Fluidotherapy 97022 x 15 min With SROM as tolerated and light isometric pinch   Paraffin        MHP        Estim        Manual Therapy: 33279       Scar Massage   8 min To incision with patient education   Soft Tissue:   15 min Cross friction, MFR to area   PROM/AAROM:        PROM to [facility]   4 min                             Therapeutic Exercises: 0-35*  0-43*  Thumb IP 0-80:                                   0-32*  0-45*  Thumb Radial ABduction 0-55: TBA  0-50*  Thumb Palmar ABduction 0-45:         TBA  -18*-47*  Thumb Opposition:                             TBA  Full     May initiate gentle strengthening at 8 weeks post-op:  Dynamometer (setting 2):                              Left: TBA  30#                                             Right: TBA  44#                    Pinch Meter:              Lateral: Left= TBA  3.5#,     Right= TBA  8#              Palmar 3 point: Left= TBA  2.5#, Right= TBA  7#  9 Hole Peg Test:              Left: TBA  23 sec              Right: TBA  22 sec    QuickDASH: 47.73% disability    Pt is making Fair progress toward stated plan of care. -Rehab Potential: Good  -Requires OT Follow Up: Yes    Goal Progress:   1) Patient will demonstrate good understanding of home program (exercises/activities/diagnosis/prognosis/goals) with good accuracy. Goal progressing. Pt may be completing too much functional activities at home without taking rest breaks. Pt may be overworking hand at home that is contributing to increased pain. Pt to rest hand. 2) Patient will demonstrate increased active/passive range of motion of their Left thumb to [facility] for proper ADL/IADL completion. Goal progressing well. Measurements shown above. 3) Patient will demonstrate increased /pinch strength of at least 10 / 5 pinch pounds of their L hand. Goal progressing well. Good progress toward  strength with no pain during testing. Slow progress toward pinch strength with mild pain during testing. 4) Increase in fine motor function as evidenced by decreased time to complete 9-hole peg test and/or MRMT test by at least 10 seconds. Goal discharged. 5) Patient to report 100% compliance with their splint wear, care, and precautions if needed. Goal progressing well.  Pt now wearing comfort cool brace at all times that she finds pain relief from. 6) Patient to demonstrate decreased guarding of their affected extremity from 60% to 10% or less. Goal Progressing well. Pt may be using hand too much at home. Strongly urged pt to rest L hand to reduce pain. 7) Patient will be knowledgeable of edema control techniques as evident with decreases from minimal to none. Goal progressing well. Slight edema evident. 8) Patient will demonstrate a non-tender/non-adherent scar. Goal progressing well. 9) Patient will report ADL functions same as prior to diagnosis of L CMC arthroplasty. Goal progressing. Not lifting any heavy objects, pain increased with increased activity. 10) Patient will demonstrate increased active/passive range of motion of their Left wrist to [facility] for proper ADL/IADL completion. Goal progressing. Slow progress toward wrist flexion. Measurements shown above.       Plan: Frequency/Duration 1-2 / week for 0-55 UDZRAR if needed.    Certification period From: 12/30/2019  To: 3/16/2020    [x]  Continues Plan of care: Continue with therapy 2x/week for next 4 weeks to continue progression toward ROM, strength, and pain management. Treatment delivered based on POC and graduated to patient's progress. Patient education continues at each visit to obtain maximum benefit from skilled OT intervention.   []  Alter Plan of care:   []  Discharge:     Billing:     TIME IN: 100   TIME OUT: 210 TOTAL TREATMENT TIME: 60 TOTAL TIME: 70    CODE  Time In Time Out TODAY MINUTES TODAY UNITS  TOTAL USED TOTAL ALLOWED   94456 Manual          63961 Therapeutic Ex   35 2      43996 Therapeutic Activity   10 1      55984 Fluidotherapy   15 1      72667 ADL/COMP Tech Train          40319 Neuromuscular Re-Ed          45442 OrthoManagementTraining           Modality:           Other                                           TOTAL    60 1291 [facility] [address]  [address], [id number]

## 2020-02-12 ENCOUNTER — HOSPITAL ENCOUNTER (OUTPATIENT)
Dept: OCCUPATIONAL THERAPY | Age: 75
Setting detail: THERAPIES SERIES
Discharge: HOME | End: 2020-02-12
Payer: MEDICARE

## 2020-02-12 PROCEDURE — 97110 THERAPEUTIC EXERCISES: CPT

## 2020-02-12 PROCEDURE — 97530 THERAPEUTIC ACTIVITIES: CPT

## 2020-02-12 PROCEDURE — 97022 WHIRLPOOL THERAPY: CPT

## 2020-02-12 NOTE — PROGRESS NOTES
[facility] THERAPY PROGRESS NOTE    . [address] [address]   Phone: [phone]   Fax: [phone]     Date:  2020 Initial Evaluation Date: 2019  Evaluating Therapist: Junior Bello    Patient Name:  Alan Leigh    :  1945  Restrictions/Precautions:  Thumb CMC Arthroplasty-4 weeks post-op, low fall risk  Diagnosis:  L [facility] Arthroplasty                                                          Insurance/Certification information: Medicare / Medical [place]  Referring Practitioner:  Geovany Lang MD  Date of Surgery/Injury: 2019  Plan of care signed (Y/N):  N  Visit# / total visits: -   Reassessment: 2020  S Lara    Pain Level: 0/10     Subjective:  Seen 2/2 scheduled visits. WEEK 10 of protocol. Reports she has worn the brace and taken her usual medication this week and did not stress her thumb and she has no pain today. Encouraged continue complaince to program to alleviate pain. She is to back off all stressful ADL tasks this weekend. Objective: Engaged patient in the following with focus on edema control, scar management, light AROM per protocol. Reassessment at or by each 10th visit. Week 10:  Progressive Strengthening. Continue use of splint when not strengthening. OK to begin use of thumb for ADLs in splint. HEP strengthening continue  AROM, PROM TO CMC, Scar Mgmt, NON Weight Bearing, Use comfort cool brace discharge hard brace. No ADLs with affected thumb at this time(week 10).   INTERVENTION: CPT Code COMPLETED REPS/TIME: SPECIFICS/COMMENTS:   Modality:         Fluidotherapy 97022 x 15 min With SROM as tolerated and light isometric pinch   Paraffin        MHP        Estim        Manual Therapy: 31463       Scar Massage   8 min To incision with patient education   Soft Tissue:   15 min Cross friction, MFR to area   PROM/AAROM:        PROM to [facility]   4 min Therapeutic Exercises:   00497       AROM               STRETCHING  x 5 min Prayers as tolerated   Stretching  x \"   included Web space   Free Wts  x 10 x 3  15 min 2# forearm/wrist flex/ex and ulnar/rad deviation each 3 sets   Therapeutic Activity: 64102      Towel Task   4 min Functional endurance tolerance of forearm and digits excluding thumb   Cotton Ball Manipulation with \"O\" positioning of thumb and index       Colton Whall ball   5 min    TIP to TIP pinch task  x 12 min Use of med putty and Maintaining 'O' position with multiple  and manipulate of small graded objects in hand over 7 min           Other:       Splint adjustment       Kinesio tape    6 min To assist with stretch completed today. Wear care instructions provided. Tape applies to dorsal surface and thumb. Re-Assessment  x 30 min See below. Assessment: Patient shows potential to progress with stated goals and will be educated on HEP and provided written handouts as needed throughout their care. HEP was reviewed. Pt with increased pain on dorsal hand and thumb, may be exacerbated by completing extensive activities at home. Recommended to pt that she rest more or incorporate frequent breaks. Thumb ROM improving well, Wrist ROM improving but slowly. Strength measurements recorded today. Pt reports improved participation in daily activities but has difficulty and pain with use. Pt reports she responded much better to her last surgery on her R hand than this surgery. Re-assessment has been completed and measurements are shown below in BOLD.       Left Wrist ROM:   12/30/19 2/5/2020  Forearm Pron 0-90:                            WFL  Forearm Supination  0-90:                  WFL  Wrist Flexion 0-80:                              0-35*  0-37*  Wrist Extension 0-70:               0-30*  0-49*  Wrist Radial Deviation 0-20:               0-10*  0-10*  Wrist Ulnar Deviation 0-45:                 0-30*  0-40*     Left Hand ROM     Thumb at all times that she finds pain relief from. 6) Patient to demonstrate decreased guarding of their affected extremity from 60% to 10% or less. Goal Progressing well. Pt may be using hand too much at home. Strongly urged pt to rest L hand to reduce pain. 7) Patient will be knowledgeable of edema control techniques as evident with decreases from minimal to none. Goal progressing well. Slight edema evident. 8) Patient will demonstrate a non-tender/non-adherent scar. Goal progressing well. 9) Patient will report ADL functions same as prior to diagnosis of L CMC arthroplasty. Goal progressing. Not lifting any heavy objects, pain increased with increased activity. 10) Patient will demonstrate increased active/passive range of motion of their Left wrist to [facility] for proper ADL/IADL completion. Goal progressing. Slow progress toward wrist flexion. Measurements shown above.       Plan: Frequency/Duration 1-2 / week for 1-66 IRGYZE if needed.    Certification period From: 12/30/2019  To: 3/16/2020    [x]  Continues Plan of care: Continue with therapy 2x/week for next 4 weeks to continue progression toward ROM, strength, and pain management. Treatment delivered based on POC and graduated to patient's progress. Patient education continues at each visit to obtain maximum benefit from skilled OT intervention.   []  Alter Plan of care:   []  Discharge:     Billing:     TIME IN: 100   TIME OUT: 210 TOTAL TREATMENT TIME: 60 TOTAL TIME: 70    CODE  Time In Time Out TODAY MINUTES TODAY UNITS  TOTAL USED TOTAL ALLOWED   97891 Manual          73239 Therapeutic Ex   35 2      33087 Therapeutic Activity   10 1      56544 Fluidotherapy   15 1      36876 ADL/COMP Tech Train          29407 Neuromuscular Re-Ed          42436 OrthoManagementTraining           Modality:           Other                                           TOTAL    60 1291 [facility] [address]  [address], [id number]

## 2020-02-17 ENCOUNTER — HOSPITAL ENCOUNTER (OUTPATIENT)
Dept: OCCUPATIONAL THERAPY | Age: 75
Setting detail: THERAPIES SERIES
Discharge: HOME | End: 2020-02-17
Payer: MEDICARE

## 2020-02-17 PROCEDURE — 97022 WHIRLPOOL THERAPY: CPT

## 2020-02-17 PROCEDURE — 97110 THERAPEUTIC EXERCISES: CPT

## 2020-02-17 PROCEDURE — 97530 THERAPEUTIC ACTIVITIES: CPT

## 2020-02-17 NOTE — PROGRESS NOTES
Prayers as tolerated   Stretching  x \"   included Web space   Free Wts  x 10 x 3  15 min 2# forearm/wrist flex/ex and ulnar/rad deviation each 3 sets   Therapeutic Activity: 27657      Towel Task   4 min Functional endurance tolerance of forearm and digits excluding thumb   Cotton Ball Manipulation with \"O\" positioning of thumb and index       Colton Whall ball   5 min    TIP to TIP pinch task  x 12 min Use of med putty and Maintaining 'O' position with multiple  and manipulate of small graded objects in hand over 7 min           Other:       Splint adjustment       Kinesio tape    6 min To assist with stretch completed today. Wear care instructions provided. Tape applies to dorsal surface and thumb. Re-Assessment  x 30 min See below. Assessment: Patient shows potential to progress with stated goals and will be educated on HEP and provided written handouts as needed throughout their care. HEP was reviewed. Pt with increased pain on dorsal hand and thumb, may be exacerbated by completing extensive activities at home. Recommended to pt that she rest more or incorporate frequent breaks. Thumb ROM improving well, Wrist ROM improving but slowly. Strength measurements recorded today. Pt reports improved participation in daily activities but has difficulty and pain with use. Pt reports she responded much better to her last surgery on her R hand than this surgery. Re-assessment has been completed and measurements are shown below in BOLD.       Left Wrist ROM:   12/30/19 2/5/2020  Forearm Pron 0-90:                            WFL  Forearm Supination  0-90:                  WFL  Wrist Flexion 0-80:                              0-35*  0-37*  Wrist Extension 0-70:               0-30*  0-49*  Wrist Radial Deviation 0-20:               0-10*  0-10*  Wrist Ulnar Deviation 0-45:                 0-30*  0-40*     Left Hand ROM     Thumb MCP 0-50:                              0-35*  0-43*  Thumb IP 0-80: 0-32*  0-45*  Thumb Radial ABduction 0-55: TBA  0-50*  Thumb Palmar ABduction 0-45:         TBA  -18*-47*  Thumb Opposition:                             TBA  Full     May initiate gentle strengthening at 8 weeks post-op:  Dynamometer (setting 2):                              Left: TBA  30#                                             Right: TBA  44#                    Pinch Meter:              Lateral: Left= TBA  3.5#,     Right= TBA  8#              Palmar 3 point: Left= TBA  2.5#, Right= TBA  7#  9 Hole Peg Test:              Left: TBA  23 sec              Right: TBA  22 sec    QuickDASH: 47.73% disability    Pt is making Fair progress toward stated plan of care. -Rehab Potential: Good  -Requires OT Follow Up: Yes    Goal Progress:   1) Patient will demonstrate good understanding of home program (exercises/activities/diagnosis/prognosis/goals) with good accuracy. Goal progressing. Pt may be completing too much functional activities at home without taking rest breaks. Pt may be overworking hand at home that is contributing to increased pain. Pt to rest hand. 2) Patient will demonstrate increased active/passive range of motion of their Left thumb to [facility] for proper ADL/IADL completion. Goal progressing well. Measurements shown above. 3) Patient will demonstrate increased /pinch strength of at least 10 / 5 pinch pounds of their L hand. Goal progressing well. Good progress toward  strength with no pain during testing. Slow progress toward pinch strength with mild pain during testing. 4) Increase in fine motor function as evidenced by decreased time to complete 9-hole peg test and/or MRMT test by at least 10 seconds. Goal discharged. 5) Patient to report 100% compliance with their splint wear, care, and precautions if needed. Goal progressing well. Pt now wearing comfort cool brace at all times that she finds pain relief from.   6) Patient to demonstrate decreased guarding of their affected extremity from 60% to 10% or less. Goal Progressing well. Pt may be using hand too much at home. Strongly urged pt to rest L hand to reduce pain. 7) Patient will be knowledgeable of edema control techniques as evident with decreases from minimal to none. Goal progressing well. Slight edema evident. 8) Patient will demonstrate a non-tender/non-adherent scar. Goal progressing well. 9) Patient will report ADL functions same as prior to diagnosis of L CMC arthroplasty. Goal progressing. Not lifting any heavy objects, pain increased with increased activity. 10) Patient will demonstrate increased active/passive range of motion of their Left wrist to [facility] for proper ADL/IADL completion. Goal progressing. Slow progress toward wrist flexion. Measurements shown above.       Plan: Frequency/Duration 1-2 / week for 9-61 PKOUUV if needed.    Certification period From: 12/30/2019  To: 3/16/2020    [x]  Continues Plan of care: Continue with therapy 2x/week for next 4 weeks to continue progression toward ROM, strength, and pain management. Treatment delivered based on POC and graduated to patient's progress. Patient education continues at each visit to obtain maximum benefit from skilled OT intervention.   []  Alter Plan of care:   []  Discharge:     Billing:     TIME IN: 100   TIME OUT: 210 TOTAL TREATMENT TIME: 60 TOTAL TIME: 70    CODE  Time In Time Out TODAY MINUTES TODAY UNITS  TOTAL USED TOTAL ALLOWED   61349 Manual          14477 Therapeutic Ex   35 2      24932 Therapeutic Activity   10 1      17663 Fluidotherapy   15 1      04047 ADL/COMP Tech Train          52253 Neuromuscular Re-Ed          46843 OrthoManagementTraining           Modality:           Other                                           TOTAL    60 [address],   [address], [id number]

## 2020-02-19 ENCOUNTER — HOSPITAL ENCOUNTER (OUTPATIENT)
Dept: OCCUPATIONAL THERAPY | Age: 75
Setting detail: THERAPIES SERIES
Discharge: HOME | End: 2020-02-19
Payer: MEDICARE

## 2020-02-19 PROCEDURE — 97530 THERAPEUTIC ACTIVITIES: CPT | Performed by: OCCUPATIONAL THERAPIST

## 2020-02-19 PROCEDURE — 97110 THERAPEUTIC EXERCISES: CPT | Performed by: OCCUPATIONAL THERAPIST

## 2020-02-19 PROCEDURE — 97022 WHIRLPOOL THERAPY: CPT | Performed by: OCCUPATIONAL THERAPIST

## 2020-02-19 NOTE — PROGRESS NOTES
[facility] THERAPY PROGRESS NOTE  DISCHARGE SUMMARY    [place] [address] [address]   Phone: [phone]   Fax: [phone]     Date:  2020 Initial Evaluation Date: 2019  Evaluating Therapist: Aaksah Reilly    Patient Name:  Haroon Jernigan    :  1945  Restrictions/Precautions:  Thumb CMC Arthroplasty-4 weeks post-op, low fall risk  Diagnosis:  L [facility] Arthroplasty                                                          Insurance/Certification information: Medicare / Medical [place]  Referring Practitioner:  Romel Bronson MD  Date of Surgery/Injury: 2019  Plan of care signed (Y/N):  Y  Visit# / total visits: 15 / 12-     Pain Level: 0/10      Subjective:  Seen 1/2 scheduled visits. WEEK 11 of protocol. Reports she did not stress her thumb and she has no pain today. Encouraged continue complaince to program to alleviate pain. She is to back off all stressful ADL tasks. Objective: Last re-assessment completed on 2020 for 10th visit. INTERVENTION: CPT Code COMPLETED REPS/TIME: SPECIFICS/COMMENTS:   Modality:         Fluidotherapy 97022 x 15 min With SROM as tolerated and light isometric pinch   Paraffin        MHP        Estim        Manual Therapy: 46130       Scar Massage   8 min To incision with patient education   Soft Tissue:   15 min Cross friction, MFR to area   PROM/AAROM:        PROM to [facility]   4 min     AROM  x 25 reps ea Completed radial and palmar abduction for CMC warm-up before strengthening.                    Therapeutic Exercises:   83542       AROM               STRETCHING   5 min Prayers as tolerated   Stretching   \"   included Web space   Free Wts   10 x 3  15 min 2# forearm/wrist flex/ex and ulnar/rad deviation each 3 sets   Therapeutic Activity: 14681      Towel Task   4 min Functional endurance tolerance of forearm and digits excluding thumb   Cotton Ball Manipulation with \"O\" positioning of thumb and index       Colton Whall ball   5 min    TIP to TIP pinch task   12 min Use of med putty and Maintaining 'O' position with multiple  and manipulate of small graded objects in hand over 7 min   Red Flexbar  x 1x15 ea Completed pronation bending, supination bending, and twisting for strengthening. Good tolerance. Putty Strengthening  x 15 min Completed gripping, rolling, isolated pinching, lateral pinch, and 3-pnt pinch 15 reps ea for L hands strengthening. Good tolerance. Other:       Splint adjustment       Kinesio tape    6 min To assist with stretch completed today. Wear care instructions provided. Tape applies to dorsal surface and thumb. Re-Assessment  x 30 min See below. Assessment: Patient shows potential to progress with stated goals and will be educated on HEP and provided written handouts as needed throughout their care. HEP was reviewed. Pt has made significant gains in pain, strength, and overall ROM within the last 2.5 weeks since last re-assessment. Pt is very pleased with her outcomes and reports no limitations with daily functional activities. Her overall quality of life has significantly improved based off of pt report. She is no longer in need of skilled OT. Therapist and pt have both agreed to discharge. Re-assessment has been completed and measurements are shown below in BOLD.       Left Wrist ROM:   12/30/19 2/5/2020 2/19/2020  Forearm Pron 0-90:                            WFL  Forearm Supination  0-90:                  WFL  Wrist Flexion 0-80:                              0-35*  0-37*  0-75*  Wrist Extension 0-70:               0-30*  0-49*  0-65*  Wrist Radial Deviation 0-20:               0-10*  0-10*  0-15*  Wrist Ulnar Deviation 0-45:                 0-30*  0-40*  0-41*     Left Hand ROM     Thumb MCP 0-50:                              0-35*  0-43*  0-50*   Thumb IP 0-80:                                   0-32*  0-45*  0-57*  Thumb Radial ABduction 0-55: TBA  0-50*  0-50*  Thumb Palmar ABduction 0-45:         TBA  -18*-47* 0-55*  Thumb Opposition:                             TBA  Full    Dynamometer (setting 2):                              Left: TBA  30#  To 40#                                          Right: TBA  44#                    Pinch Meter:              Lateral: Left= TBA, To 3.5#, To 6# ,     Right= TBA, To 8#              Palmar 3 point: Left= TBA, To 2.5#, To 6#, Right= TBA, To 7#  9 Hole Peg Test:              Left: TBA, To 23 sec              Right: TBA, To 22 sec    QuickDASH: 47.73% disability  To 2.3% disability    Pt is making Fair progress toward stated plan of care. -Rehab Potential: Good  -Requires OT Follow Up: Yes    Goal Progress:   1) Patient will demonstrate good understanding of home program (exercises/activities/diagnosis/prognosis/goals) with good accuracy. Goal Discharged. Reviewed HEP with pt with good compliance. 2) Patient will demonstrate increased active/passive range of motion of their Left thumb to [facility] for proper ADL/IADL completion. Goal Discharged. Measurements shown above. Very Good Gains. 3) Patient will demonstrate increased /pinch strength of at least 10 / 5 pinch pounds of their L hand. Goal Discharged. Measurements shown above. Very Good Gains. Pt has met max potential while in therapy. 4) Increase in fine motor function as evidenced by decreased time to complete 9-hole peg test and/or MRMT test by at least 10 seconds. Goal Discharged. WFL's.   5) Patient to report 100% compliance with their splint wear, care, and precautions if needed. Goal Discharged. Splint Discharged. 6) Patient to demonstrate decreased guarding of their affected extremity from 60% to 10% or less. Goal Discharged. 0% Guarding. 7) Patient will be knowledgeable of edema control techniques as evident with decreases from minimal to none. Goal Discharged, no edema evident.   8) Patient will demonstrate a non-tender/non-adherent scar. Goal Discharged. 9) Patient will report ADL functions same as prior to diagnosis of L CMC arthroplasty. Goal Discharged. Pt reports no limitations. 10) Patient will demonstrate increased active/passive range of motion of their Left wrist to [facility] for proper ADL/IADL completion. Goal Discharged.      Billing:     TIME IN: 1305   TIME OUT: 1400 TOTAL TREATMENT TIME: [address] UNITS  TOTAL USED TOTAL ALLOWED   40039 Manual        48691 Therapeutic Ex 30 2      04522 Therapeutic Activity 10 1      70291 Fluidotherapy 15 1      45535 ADL/COMP Tech Train        79140 Neuromuscular Re-Ed        69165 OrthoManagementTraining         Modality:         Other                                   TOTAL  55 4                   Plan: Frequency/Duration 1-2 / week for 3-21 HIDKFH if needed.    Certification period From: 12/30/2019  To: 3/16/2020    []  Continues Plan of care: Continue with therapy 2x/week for next 4 weeks to continue progression toward ROM, strength, and pain management. Treatment delivered based on POC and graduated to patient's progress. Patient education continues at each visit to obtain maximum benefit from skilled OT intervention. []  Alter Plan of care:   [x]  Discharge: Pt has successfully achieved 10/10 goals set on her initial POC. She has made substantial gains in ROM of the wrist and the thumb, overall strength, pain, edema, and functional use of the L hand. Pt reports no limitations with daily functional activities, she is pleased with her gains. Reviewed HEP with good understanding. Pt no longer requires skilled OT services. She is to call with questions/concerns PRN. Please sign below if you are in agreement with plan to discharge per Medicare guidelines. Thank you!        Physician's Signature:_____________________ Date:__________________           RAMO Ge, OTR/L #[id number]

## 2020-02-24 ENCOUNTER — APPOINTMENT (OUTPATIENT)
Dept: OCCUPATIONAL THERAPY | Age: 75
End: 2020-02-24
Payer: MEDICARE

## 2020-10-08 ENCOUNTER — HOSPITAL ENCOUNTER (OUTPATIENT)
Dept: OCCUPATIONAL THERAPY | Age: 75
Setting detail: THERAPIES SERIES
Discharge: HOME | End: 2020-10-08
Payer: MEDICARE

## 2020-10-08 PROCEDURE — 97165 OT EVAL LOW COMPLEX 30 MIN: CPT | Performed by: OCCUPATIONAL THERAPIST

## 2020-10-08 PROCEDURE — 97110 THERAPEUTIC EXERCISES: CPT | Performed by: OCCUPATIONAL THERAPIST

## 2020-10-08 PROCEDURE — 97140 MANUAL THERAPY 1/> REGIONS: CPT | Performed by: OCCUPATIONAL THERAPIST

## 2020-10-08 NOTE — PROGRESS NOTES
[address] THERAPY  [address] [place] [address]  Dept: [phone]  Loc: [address] [facility] OT fax [phone]    Date:  10/8/2020  Initial Evaluation Date: 10/8/20   Evaluating Therapist: Linda Venegas OT/SHAMEKA, CHT    Patient Name:  Joe Wynn    :  1945    Restrictions/Precautions:  Per protocol, low fall risk  Diagnosis:  Revision of cmc arthoplasty left hand       Insurance/Certification information:  Medicare/ medical [place]  Referring Practitioner:  Dr. Harriett RAMIREZ  Referring Practitioner specific orders: see protocol in chart. Date of Surgery/Injury: , two weeks post op on 10/6  Plan of care signed (Y/N):  no  Visit# / total visits:     Past Medical History:   Past Medical History:   Diagnosis Date    Arthritis     Fibromyalgia     Hypothyroid     Motion sickness     Osteoporosis     Pneumonia     PONV (postoperative nausea and vomiting)     Primary osteoarthritis of both first carpometacarpal joints     SCHEDULED FOR THE  RIGHT THUMB SURGERY ON 2018  W Wen      Past Surgical History:   Past Surgical History:   Procedure Laterality Date    BACK SURGERY  2018    L4 L5 fusion    BREAST SURGERY Bilateral     breast tissue removed and implants placed    CHOLECYSTECTOMY      COLONOSCOPY      ENDOSCOPY, COLON, DIAGNOSTIC      EYE SURGERY Bilateral     CATARACT SURGERY    HAND SURGERY Right     HAND SURGERY Left 2019    CMC arthroplasty    HAND SURGERY Left 2020    L thumb carpometacarpal arthroplasty    OVARY REMOVAL      SKIN BIOPSY      benign       Reason for Referral: PT. Had difficulty using her thumb functionally and clicking in the thumb secondary to dequarvains tendinitis. She then underwent release of thumb tendons and revision to cmc arthoplasty with tightrope procedure. Home Living: lives with daughter  Prior Level of Function: independent    Cognition:   Alert/Oriented x3     IADL STATUS:      Ind  Mod I  Min A  Mod A  Max A  Dep  N/A    Homemaking Responsibility:  []   [x]   []   []   []   []  []  Shopping Responsibility:  []   [x]   []   []   []   []  []  Mode of Transportation:  []   [x]   []   []   []   []  []  Leisure & Hobbies:   []   []   []   []   []   [x]  []  Work:     []   []   []   []   []   []  [x]  Comments:     ADL STATUS:    Ind      Mod I    Min A  Mod A  Max A  Dep  N/A  Feeding:  []   [x]   []   []   []   []  []  Grooming:  []   [x]   []   []   []   []  []  Bathing:  []   [x]   []   []   []   []  []  UE Dressing:  []   [x]   []   []   []   []  []  LE Dressing:  []   [x]   []   []   []   []  []  Toileting:  [x]   []   []   []   []   []  []  Transfer:  [x]   []   []   []   []   []  []  Comments:    Pain Level: 3 on scale of 1-10, aching    UE Assessment:  Observation: Some atrophy noted left thumb  Active wrist ext/flex: 60/45  Active finger ext/flex: full range  Active thumb mcp ext/flex: 0/30, ip ext/flex: 0/45  Comment: Hand Dominance is right    Sensation: left hand  SEMMES-PEPPER Median N Ulnar N Radial N Other   Normal Touch  Size: 2.83       Diminished Light Touch   Size: 3.61 Middle, ring and small      Diminished Protective Sense  Size: 4.31 Thumb and index      Loss of Protective Sense   Size: 4.56       Loss of Sensation  Size: 6.65         Edema Description/Circumferential Measurements:   mild  Dynamometer (setting 2): deferred until protocol permitted     Left: TBA      Right: TBA    Pinch Meter:   Lateral: Left= TBA,Right= TBA   Palmar 3 point: Left= TBA, Right= TBA  9 Hole Peg Test:   Left: TBA   Right: TBA    QuickDASH Score: (Scale 100% full disability, 0 no disability): 61.36%    Intervention: mhp, soft tissue moblility, rom fingers, rom thumb mcp and ip, wrist all planes.  Reviewed home rom exercises to perform 4 times daily    Assessment of current deficits   Functional mobility [x]  ADLs [x] Strength [x]  Cognition []  Functional transfers  [] IADLs [] Safety Awareness [x]  Endurance [x]  Fine Motor Coordination [x] Balance [] Vision/perception [] Sensation []   Gross Motor Coordination [x] ROM [x]     Eval Complexity: low  Profile and History- low  Assessment of Occupational Performance and Identification of Deficits- low  Clinical Decision Making- low    Rehab Potential:                                 [x] Good  [] Fair  [] Poor        Suggested Professional Referral:       [x] No  [] Yes:  Barriers to Goal Achievement[de-identified]          [x] No  [] Yes:  Domestic Concerns:                           [x] No  [] Yes:     Goal Formulation: Patient  Time In: 4            Time Out: 5                      Timed Code Treatment Minutes: 60 minutes        PLAN      Treatment to include:   [x] Instruction in HEP                   Modalities:  [x] Therapeutic Exercise        [x] Ultrasound               [] Electrical Stimulation/Attended  [x] PROM/Stretching                    [x] Fluidotherapy          [x]  Paraffin                   [x] AAROM  [x] AROM                 [] Iontophoresis: 4 mg/mL;  Dexamethasone Sodium                                        [] Neuromuscular Re-education [x] Splinting         [] Desensitization          [x] Therapeutic Activity       [] Pain Management with/without modalities PRN                 [x] Manual Therapy/Fascial release                            [x] Tendon Glides        []Joint Protection/Training  []Ergonomics                             [x] Joint Mobilization        [] Adaptive Equipment Assessment/Training                             [x] Manual Edema Mobilization    [] Energy Conservation/Work Simplification  [x] GM/FM Coordination       [x] Safety retraining/education per  individual diagnosis/goals  [x] Scar Management        [x] ADL/IADL re-training       Patient Specific Goal: normal use of right hand GOALS (Long term same as Short term):  1) Patient will demonstrate good understanding of home program (exercises/activities/diagnosis/prognosis/goals) with good accuracy. 2) Patient will demonstrate increased active/passive range of motion of their affected extremity/hand to [facility] for ADL/IADL completion. 3) Patient will demonstrate increased /pinch strength of at least 10 / 5 pinch pounds of their affected hand. Will assess when protocol permitted  4) Patient to report decreased pain in their affected hand/upper extremity from 0-4/10 to 0-2/10 or less with resistive functional use. 5) Increase in fine motor function to [facility] by evidenced by completing 9 hole peg testing under 30 seconds. Will assess at 6 weeks post op  6) Patient to report 100% compliance with their splint wear, care, and precautions if needed. 7) Patient will be knowledgeable of edema control techniques as evident with decreases from mild to none. 8) Patient will demonstrate a non-tender/non-adherent scar. 9) Patient will report ADL functions same as prior to diagnosis of cmc arthroplasty. 10) Patient will decrease QuickDASH score by 50% for increased participation in daily functional activities. Patient. Education:  [x] Plans/Goals, Risks/Benefits discussed  [x] Home exercise program  Method of Education: [x] Verbal  [x] Demo  [] Written  Comprehension of Education:  [x] Verbalizes understanding. [x] Demonstrates understanding. [] Needs Review. [] Demonstrates/verbalizes understanding of HEP/Ed previously given. Patient understands diagnosis/prognosis and consents to treatment, plan and goals: [x] Yes    [] No      Electronically signed by: Areli Elizabeth OT/SHAMEKA, GRACE [id number]        [id number] Certification / Comments      Frequency/Duration 2 / week for 18 visits.    Certification period From: present  To: 12/8/20     I have reviewed the Plan of Care established for skilled therapy services and certify that the services are required and that they will be provided while the patient is under my care. Physician's Comments/Revisions:                   Physicians's Printed Name:  Dr. Alisha Bustillos                                  Physician's Signature:                                                               Date:10/8/20       Please review Patient's OT evaluation and if you agree sign/date and fax back to us at our [address] [place] [address] OT fax [phone].  Thank you for your referral!

## 2020-10-13 ENCOUNTER — HOSPITAL ENCOUNTER (OUTPATIENT)
Dept: OCCUPATIONAL THERAPY | Age: 75
Setting detail: THERAPIES SERIES
Discharge: HOME | End: 2020-10-13
Payer: MEDICARE

## 2020-10-13 PROCEDURE — 97022 WHIRLPOOL THERAPY: CPT

## 2020-10-13 PROCEDURE — 97110 THERAPEUTIC EXERCISES: CPT

## 2020-10-13 PROCEDURE — 97140 MANUAL THERAPY 1/> REGIONS: CPT

## 2020-10-13 NOTE — PROGRESS NOTES
[facility] AT [place]     OCCUPATIONAL THERAPY PROGRESS NOTE    [facility]  [address]  Cecelia Zamora   Phone: [phone]   Fax: [phone]     Date:  10/13/2020  Initial Evaluation Date: 10/8/2020 Evaluating Therapist: ROSALBA Merritt    Patient Name:  Elvis Zhang    :  1945  Restrictions/Precautions:  Per protocol, low fall risk;   POST OPERATIVE PLAN  2wk:  Forearm based opponens splint.  Begin immediate wrist/thumb ROM with OT without restrictions  4wk:  Hand based splint   Around 10/20/2020  6wk:  DC rigit splint - comfort cool  Around 11/3/2020  8wk: Terryann Hedger strengthening  Around 2020  Diagnosis:  Revision of cmc arthoplasty left hand                                                      Insurance/Certification information:  Medicare/ medical mutual  Referring Practitioner:  Dr. Adolph RAMIREZ  Referring Practitioner specific orders: see protocol in chart. Date of Surgery/Injury: , two weeks post op on 10/6  Plan of care signed (Y/N):  no  Visit# / total visits:     Pain Level: mild, aching, tight (pulling) and with paresthesia    Subjective: Patient seen 1/2 scheduled visits. States; \"This surgery was much better and the casting was done properly. \"     Objective:  Engaged patient in the following with focus on ROM, scar management, functional activity tolerance, strengthening when allowed, splinting per protocol. Reassessment at or by 10th visit. INTERVENTION: CPT Code Done Today REPS/TIME: SPECIFICS/COMMENTS:   Modality:         Fluidotherapy 97022 x 15 min *Affected UE: Completes x15 min to promote tissue healing, skin desensitization, reduce inflammation, and decrease pain.   Actively completed SROM in all available planes with holds at end range during treatment       Paraffin [id number]       EStim - attended Matt Dumont [address]2926      Ultrasound [address]       [facility]       Manual Therapy: resistive functional use. 5) Increase in fine motor function to WNLs by evidenced by completing 9 hole peg testing under 30 seconds. Will assess at 6 weeks post op  6) Patient to report 100% compliance with their splint wear, care, and precautions if needed. 7) Patient will be knowledgeable of edema control techniques as evident with decreases from mild to none. 8) Patient will demonstrate a non-tender/non-adherent scar. 9) Patient will report ADL functions same as prior to diagnosis of cmc arthroplasty. 10) Patient will decrease QuickDASH score of 61.36% by  50% for increased participation in daily functional activities.      Pt. Education:  [x] Yes  [] No  [x] Reviewed Prior HEP/Ed  Method of Education: [x] Verbal  [x] Demo  [] Written  Comprehension of Education:  [x] Verbalizes understanding. [x] Demonstrates understanding. [x] Needs review at next sesion  [] Demonstrates/verbalizes HEP/Ed previously given. Plan:   [x]  Continues Plan of care: Treatment delivered based on POC and graduated to patient's progress. Patient education continues at each visit to obtain maximum benefit from skilled OT intervention. []  Alter Plan of care:   []  Discharge:    UCRCR-03 Screening completed upon entering facility and patient cleared for treatment today. Pt followed all protocols set forth by [facility] for Outpatient services. Patient has been made aware of all new hygiene protocols due to COVID-19 set forth by [facility] Outpatient services and agrees to abide by all protocols.  Current Temp: 97.3'      Billing:    TIME IN: 11   TIME OUT: 1205 TOTAL TREATMENT TIME: 60 TOTAL TIME: 65  END DATE:  12.8.2020  CODE  TODAY MINUTES TODAY UNITS    08773 Fluidotherapy 15 1    83368 Manual 23 2    37679 Therapeutic Ex 22 1    49410 Therapeutic Activity      74258 ADL/COMP Tech Train      32060 Neuromuscular Re-Ed      23232 OrthoManagementTraining       Modality:       Other TOTAL  60 [address],  ASHLEY/SHAMEKA, [address]MQV

## 2020-10-15 ENCOUNTER — HOSPITAL ENCOUNTER (OUTPATIENT)
Dept: OCCUPATIONAL THERAPY | Age: 75
Setting detail: THERAPIES SERIES
Discharge: HOME | End: 2020-10-15
Payer: MEDICARE

## 2020-10-15 PROCEDURE — 97022 WHIRLPOOL THERAPY: CPT

## 2020-10-15 PROCEDURE — 97110 THERAPEUTIC EXERCISES: CPT

## 2020-10-15 PROCEDURE — 97140 MANUAL THERAPY 1/> REGIONS: CPT

## 2020-10-15 NOTE — PROGRESS NOTES
[facility] AT [place]     OCCUPATIONAL THERAPY PROGRESS NOTE    [facility]  [address]  Cecelia Zamora   Phone: [phone]   Fax: [phone]     Date:  10/15/2020  Initial Evaluation Date: 10/8/2020 Evaluating Therapist: ROSALBA Courtney    Patient Name:  Alfred Ngo    :  1945  Restrictions/Precautions:  Per protocol, low fall risk;   POST OPERATIVE PLAN  2wk:  Forearm based opponens splint.  Begin immediate wrist/thumb ROM with OT without restrictions  4wk:  Hand based splint   Around 10/20/2020  6wk:  DC rigit splint - comfort cool  Around 11/3/2020  8wk: Jeremy Minna strengthening  Around 2020  Diagnosis:  Revision of cmc arthoplasty left hand                                                      Insurance/Certification information:  Medicare/ medical mutual  Referring Practitioner:  Dr. Kaleb RAMIREZ  Referring Practitioner specific orders: see protocol in chart. Date of Surgery/Injury: , two weeks post op on 10/6  Plan of care signed (Y/N):  no  Visit# / total visits: 3 / 18  10th Visit Reassessment:     Pain Level:  /10 while in splint, mild, aching, tight (pulling) and with paresthesia    Subjective: Patient seen 2/2 scheduled visits. Reports her fingers felt looser after last session. Wearing splint / except for showering and ROM exercise and when doing her hair. States; \"This surgery was much better and the casting was done properly. \"     Objective:  Engaged patient in the following with focus on ROM, scar management, functional activity tolerance, strengthening when allowed, splinting per protocol. Reassessment at or by 10th visit. INTERVENTION: CPT Code Done Today REPS/TIME: SPECIFICS/COMMENTS:   Modality:         Fluidotherapy 97022 x 15 min *Affected UE: Completes x15 min to promote tissue healing, skin desensitization, reduce inflammation, and decrease pain.   Actively completed SROM in all available planes with holds at end range during treatment       Paraffin 08176       EStim - attended 89711       [place] [address]      Ultrasound 72117       [facility]       Manual Therapy: 40671       Scar Massage  x 10 min  to LUE at incision sites on dorsal surface of hand and thumb   Soft Tissue  x 13 min  to LUE with MFR with focus on thena   Joint Mobilization/Trigger Point release, etc.        PROM                        Therapeutic Exercises: 84047       AAROM        AROM       Facilitated Stretching                                                 Therapeutic Activity: 13233      FM pinch and place  x 11 min Peg board with gathering pins and placing and removing multipules   Sensory retraining/pinch  x 11 min Graded particles in textured medium find and  with three point pinch                        ADL Retraining 61411                     Other:              Splinting/Ortho Mgmt 31737      HEP  x  Throughout Session with instructions and handouts as needed   Assessment: Patient shows potential to progress with stated goals and will be educated on HEP and provided written handouts as needed throughout their care. Initial UE Assessment:      Left Upper Extremity ROM   KEY: E/F     AROM(PROM)            10/08  WRIST Extension 60-75*/ Flexion 60-80* 60/45         Radial Deviation 20-25*          Ulnar Deviation 30-39*             Left Hand ROM  Thumb AROM  (PROM)          CMC  E/F :           MP E/F : 0/50-60' 0/30         IP E/F : 0/60-80' 0/45                                Pt is making Fair + progress toward stated plan of care. -Rehab Potential: Good  -Requires OT Follow Up: Yes    Goal Progress:   1) Patient will demonstrate good understanding of home program with good accuracy. 2) Patient will demonstrate increased active/passive range of motion of their affected extremity/hand to [facility] for ADL/IADL completion.   3) Patient will demonstrate increased /pinch strength of at least 10 / 5 pinch pounds of their affected hand. Will assess when protocol permitted  4) Patient to report decreased pain in their affected hand/upper extremity from 0-4/10 to 0-2/10 or less with resistive functional use. 5) Increase in fine motor function to WNLs by evidenced by completing 9 hole peg testing under 30 seconds. Will assess at 6 weeks post op  6) Patient to report 100% compliance with their splint wear, care, and precautions if needed. 7) Patient will be knowledgeable of edema control techniques as evident with decreases from mild to none. 8) Patient will demonstrate a non-tender/non-adherent scar. 9) Patient will report ADL functions same as prior to diagnosis of cmc arthroplasty. 10) Patient will decrease QuickDASH score of 61.36% to 50% points less for increased participation in daily functional activities.      Pt. Education:  [x] Yes  [] No  [x] Reviewed Prior HEP/Ed  Method of Education: [x] Verbal  [x] Demo  [] Written  Comprehension of Education:  [x] Verbalizes understanding. [x] Demonstrates understanding. [x] Needs review at next sesion  [] Demonstrates/verbalizes HEP/Ed previously given. Plan:   [x]  Continues Plan of care: Treatment delivered based on POC and graduated to patient's progress. Patient education continues at each visit to obtain maximum benefit from skilled OT intervention. []  Alter Plan of care:   []  Discharge:    [id number] Screening completed upon entering facility and patient cleared for treatment today. Pt followed all protocols set forth by [facility] for Outpatient services. Patient has been made aware of all new hygiene protocols due to COVID-19 set forth by [facility] Outpatient services and agrees to abide by all protocols.  Current Temp: 97.3'      Billing:    TIME IN: 3   TIME OUT: 410 TOTAL TREATMENT TIME: 60 TOTAL TIME: 65  END DATE:  12.8.2020  CODE  TODAY MINUTES TODAY UNITS    27413 Fluidotherapy 15 1    58196 Manual 23 2    35034 Therapeutic Ex 22 1    72750 Therapeutic Activity      06860 ADL/COMP Tech Train      54026 Neuromuscular Re-Ed      97980 OrthoManagementTraining       Modality:       Other                           TOTAL  60 6315 [address],  RAMIREZ/SHAMEKA, [address]

## 2020-10-20 ENCOUNTER — HOSPITAL ENCOUNTER (OUTPATIENT)
Dept: OCCUPATIONAL THERAPY | Age: 75
Setting detail: THERAPIES SERIES
Discharge: HOME | End: 2020-10-20
Payer: MEDICARE

## 2020-10-20 PROCEDURE — 97140 MANUAL THERAPY 1/> REGIONS: CPT | Performed by: OCCUPATIONAL THERAPIST

## 2020-10-20 PROCEDURE — 97110 THERAPEUTIC EXERCISES: CPT | Performed by: OCCUPATIONAL THERAPIST

## 2020-10-20 PROCEDURE — 97022 WHIRLPOOL THERAPY: CPT | Performed by: OCCUPATIONAL THERAPIST

## 2020-10-20 NOTE — PROGRESS NOTES
[address] [facility],4Th [address]     OCCUPATIONAL THERAPY PROGRESS NOTE    [facility] -  CAMPUS  [address] [facility]  [place] Zamora   Phone: [phone]   Fax: [phone]     Date:  10/20/2020  Initial Evaluation Date: 10/8/2020 Evaluating Therapist: ROSALBA Montalvo    Patient Name:  Heydi Gonzalez    :  1945  Restrictions/Precautions:  Per protocol, low fall risk;   POST OPERATIVE PLAN  2wk:  Forearm based opponens splint.  Begin immediate wrist/thumb ROM with OT without restrictions  4wk:  Hand based splint   Around 10/20/2020  6wk:  DC rigit splint - comfort cool  Around 11/3/2020  8wk: Jose Madeline strengthening  Around 2020  Diagnosis:  Revision of cmc arthoplasty left hand                                                      Insurance/Certification information:  Medicare/ medical [place]  Referring Practitioner:  Dr. Kelli RAMIREZ  Referring Practitioner specific orders: see protocol in chart. Date of Surgery/Injury: , ( 4 weeks post op)  Plan of care signed (Y/N):  no  Visit# / total visits:   10th Visit Reassessment:     Pain Level:  0/10 while in splint, mild, aching, tight (pulling) and with paresthesia    Subjective: Pt reports she feels her hand is moving better. Objective:  Engaged patient in the following with focus on ROM, scar management, functional activity tolerance, strengthening when allowed, splinting per protocol. Reassessment at or by 10th visit. INTERVENTION: CPT Code Done Today REPS/TIME: SPECIFICS/COMMENTS:   Modality:         Fluidotherapy 97022 x 15 min *Affected UE: Completes x15 min to promote tissue healing, skin desensitization, reduce inflammation, and decrease pain.   Actively completed SROM in all available planes with holds at end range during treatment       [place]affin 25785       EStim - attended Matt Dumont 04901      Ultrasound 62860       [facility]       Manual Therapy: .97.60       Scar Massage  x   to LUE at incision sites on dorsal surface of hand and thumb   Soft Tissue  x   to LUE with MFR with focus on thena   Joint Mobilization/Trigger Point release, etc.        PROM                        Therapeutic Exercises: 68666       AAROM        AROM  x   2 sets x15 each exercise: MCP and IP joint blocking, radial and palmar abduction, digit flexion/extension. Added to HEP with handout issue. Facilitated Stretching       Wristisizer  x  5 mins with elbow on table for wrist isolation                                      Therapeutic Activity: 32054      FM pinch and place   11 min Peg board with gathering pins and placing and removing multipules   Sensory retraining/pinch   11 min Graded particles in textured medium find and  with three point pinch                        ADL Retraining 58594                     Other:       Tg   x  Size D with doubling up over hand to assist with compression to dorsal    Splint adjustment  x  Cut splint to a hand based splint and adjusted for a more comfortable fit. Pt instructed to continue splint wear 24/7 except for exercises and hygiene. Splinting/Ortho Mgmt 30568      HEP  x  Throughout Session with instructions and handouts as needed   Assessment: Patient shows potential to progress with stated goals and will be educated on HEP and provided written handouts as needed throughout their care. Added radial & palmar abduction AROM exercises and cut down thumb spica to make hand based. Pt issued compression sleeve to add compression over dorsal portion of hand and was educated to double to assist with edema management pressure on the incision site. Pt demonstrated less scar tissue after massage and compression sleeve donned. Pt educated to wear under splint. ROM measurements obtained for Radial and palmar abduction due to initiation of exercises this date.       Initial UE Assessment:      Left Upper Extremity ROM   KEY: E/F     AROM(PROM) 10/08       WRIST Extension 60-75*/ Flexion 60-80* 60/45         Radial Deviation 20-25*          Ulnar Deviation 30-39*             Left Hand ROM          10/20/2020  Thumb AROM  (PROM)          CMC  E/F :           MP E/F : 0/50-60' 0/30         IP E/F : 0/60-80' 0/45         Radial abduction:   42*        Palmar Abduction:   48*           Pt is making Fair + progress toward stated plan of care. -Rehab Potential: Good  -Requires OT Follow Up: Yes    Goal Progress:   1) Patient will demonstrate good understanding of home program with good accuracy. 2) Patient will demonstrate increased active/passive range of motion of their affected extremity/hand to [facility] for ADL/IADL completion. 3) Patient will demonstrate increased /pinch strength of at least 10 / 5 pinch pounds of their affected hand. Will assess when protocol permitted  4) Patient to report decreased pain in their affected hand/upper extremity from 0-4/10 to 0-2/10 or less with resistive functional use. 5) Increase in fine motor function to WNLs by evidenced by completing 9 hole peg testing under 30 seconds. Will assess at 6 weeks post op  6) Patient to report 100% compliance with their splint wear, care, and precautions if needed. 7) Patient will be knowledgeable of edema control techniques as evident with decreases from mild to none. 8) Patient will demonstrate a non-tender/non-adherent scar. 9) Patient will report ADL functions same as prior to diagnosis of cmc arthroplasty. 10) Patient will decrease QuickDASH score of 61.36% to 50% points less for increased participation in daily functional activities.      Pt. Education:  [x] Yes  [] No  [x] Reviewed Prior HEP/Ed  Method of Education: [x] Verbal  [x] Demo  [] Written  Comprehension of Education:  [x] Verbalizes understanding. [x] Demonstrates understanding. [x] Needs review at next sesion  [] Demonstrates/verbalizes HEP/Ed previously given.      Plan:   [x]  Continues Plan of care: Treatment delivered based on POC and graduated to patient's progress. Patient education continues at each visit to obtain maximum benefit from skilled OT intervention. []  Alter Plan of care:   []  Discharge:    [id number] Screening completed upon entering facility and patient cleared for treatment today. Pt followed all protocols set forth by [facility] for Outpatient services. Patient has been made aware of all new hygiene protocols due to COVID-19 set forth by [facility] Outpatient services and agrees to abide by all protocols.  Current Temp: 96.3      Billing:    TIME IN: 3:00 pm   TIME OUT: 4:00 pm  TOTAL TREATMENT TIME: 60   END DATE:  12.8.2020  CODE  TODAY MINUTES TODAY UNITS    80536 Fluidotherapy 15 1    26944 Manual 20 1    75991 Therapeutic Ex 25 2    02579 Therapeutic Activity      35774 ADL/COMP Tech Train      65118 Neuromuscular Re-Ed      67283 OrthoManagementTraining       Modality:       Other                           TOTAL  60 632 [facility],  MS, OTR/L #[id number]

## 2020-10-22 ENCOUNTER — HOSPITAL ENCOUNTER (OUTPATIENT)
Dept: OCCUPATIONAL THERAPY | Age: 75
Setting detail: THERAPIES SERIES
Discharge: HOME | End: 2020-10-22
Payer: MEDICARE

## 2020-10-22 PROCEDURE — 97110 THERAPEUTIC EXERCISES: CPT | Performed by: OCCUPATIONAL THERAPIST

## 2020-10-22 PROCEDURE — 97022 WHIRLPOOL THERAPY: CPT | Performed by: OCCUPATIONAL THERAPIST

## 2020-10-22 PROCEDURE — 97140 MANUAL THERAPY 1/> REGIONS: CPT | Performed by: OCCUPATIONAL THERAPIST

## 2020-10-22 NOTE — PROGRESS NOTES
[address] [facility],[address]     OCCUPATIONAL THERAPY PROGRESS NOTE    [facility] -  CAMPUS  [address]  [place]   Phone: [phone]   Fax: [phone]     Date:  10/22/2020  Initial Evaluation Date: 10/8/2020 Evaluating Therapist: ROSALBA Manning    Patient Name:  Joe Messina    :  1945  Restrictions/Precautions:  Per protocol, low fall risk;   POST OPERATIVE PLAN  2wk:  Forearm based opponens splint.  Begin immediate wrist/thumb ROM with OT without restrictions  4wk:  Hand based splint   Around 10/20/2020  6wk:  DC rigit splint - comfort cool  Around 11/3/2020  8wk: [facility] House strengthening  Around 2020  Diagnosis:  Revision of cmc arthoplasty left hand                                                      Insurance/Certification information:  Medicare/ medical mutual  Referring Practitioner:  Dr. Rupa RAMIREZ  Referring Practitioner specific orders: see protocol in chart. Date of Surgery/Injury: , ( 4 weeks post op)  Plan of care signed (Y/N):  no  Visit# / total visits:   10th Visit Reassessment:     Pain Level:  0/10 while in splint, mild, aching, tight (pulling) and with paresthesia    Subjective: Pt reports my thumb is doing good. No pain. Objective:  Engaged patient in the following with focus on ROM, scar management, functional activity tolerance, strengthening when allowed, splinting per protocol. Reassessment at or by 10th visit. INTERVENTION: CPT Code Done Today REPS/TIME: SPECIFICS/COMMENTS:   Modality:         Fluidotherapy 97022 x 15 min *Affected UE: Completes x15 min to promote tissue healing, skin desensitization, reduce inflammation, and decrease pain.   Actively completed SROM in all available planes with holds at end range during treatment       Paraffin 23221       EStim - attended [id number]       [place] 65526      Ultrasound 35654       [facility]       Manual Therapy: 26103       Scar Massage x   to LUE at incision sites on dorsal surface of hand and thumb   Soft Tissue  x   to LUE with MFR with focus on thena   Joint Mobilization/Trigger Point release, etc.        PROM                        Therapeutic Exercises: 24200       AAROM        AROM  x   2 sets x15 each exercise: MCP and IP joint blocking, radial and palmar abduction, digit flexion/extension. Added to HEP with handout issue. Facilitated Stretching       Wristisizer  x  5 mins with elbow on table for wrist isolation                                      Therapeutic Activity: 73947      FM pinch and place   11 min Peg board with gathering pins and placing and removing multipules   Sensory retraining/pinch   11 min Graded particles in textured medium find and  with three point pinch                        ADL Retraining 00221                     Other:       Tg   x  Size D with doubling up over hand to assist with compression to dorsal    Splint adjustment  x  Cut splint to a hand based splint and adjusted for a more comfortable fit. Pt instructed to continue splint wear 24/7 except for exercises and hygiene. Splinting/Ortho Mgmt 53528      HEP  x  Throughout Session with instructions and handouts as needed   Assessment: Patient shows potential to progress with stated goals and will be educated on HEP and provided written handouts as needed throughout their care. Added radial & palmar abduction AROM exercises and cut down thumb spica to make hand based. Pt issued compression sleeve to add compression over dorsal portion of hand and was educated to double to assist with edema management pressure on the incision site. No pain with exercises. Rom cont's to progress. Pt. can now flatten hand on tabletop.    Initial UE Assessment:      Left Upper Extremity ROM   KEY: E/F     AROM(PROM)            10/08       WRIST Extension 60-75*/ Flexion 60-80* 60/45         Radial Deviation 20-25*          Ulnar Deviation 30-39*             Left Hand ROM          10/20/2020  Thumb AROM  (PROM)          CMC  E/F :           MP E/F : 0/50-60' 0/30         IP E/F : 0/60-80' 0/45         Radial abduction:   42*        Palmar Abduction:   48*           Pt is making Fair + progress toward stated plan of care. -Rehab Potential: Good  -Requires OT Follow Up: Yes    Goal Progress:   1) Patient will demonstrate good understanding of home program with good accuracy. 2) Patient will demonstrate increased active/passive range of motion of their affected extremity/hand to [facility] for ADL/IADL completion. 3) Patient will demonstrate increased /pinch strength of at least 10 / 5 pinch pounds of their affected hand. Will assess when protocol permitted  4) Patient to report decreased pain in their affected hand/upper extremity from 0-4/10 to 0-2/10 or less with resistive functional use. 5) Increase in fine motor function to [facility] by evidenced by completing 9 hole peg testing under 30 seconds. Will assess at 6 weeks post op  6) Patient to report 100% compliance with their splint wear, care, and precautions if needed. 7) Patient will be knowledgeable of edema control techniques as evident with decreases from mild to none. 8) Patient will demonstrate a non-tender/non-adherent scar. 9) Patient will report ADL functions same as prior to diagnosis of cmc arthroplasty. 10) Patient will decrease QuickDASH score of 61.36% to 50% points less for increased participation in daily functional activities.      Pt. Education:  [x] Yes  [] No  [x] Reviewed Prior HEP/Ed  Method of Education: [x] Verbal  [x] Demo  [] Written  Comprehension of Education:  [x] Verbalizes understanding. [x] Demonstrates understanding. [x] Needs review at next sesion  [] Demonstrates/verbalizes HEP/Ed previously given. Plan:   [x]  Continues Plan of care: Treatment delivered based on POC and graduated to patient's progress.           Patient education continues at each visit to obtain maximum benefit from skilled OT intervention. []  Alter Plan of care:   []  Discharge:    [id number] Screening completed upon entering facility and patient cleared for treatment today. Pt followed all protocols set forth by [facility] for Outpatient services. Patient has been made aware of all new hygiene protocols due to COVID-19 set forth by [facility] Outpatient services and agrees to abide by all protocols.  Current Temp: 96.3      Billing:    TIME IN: 3:00 pm   TIME OUT: 4:00 pm  TOTAL TREATMENT TIME: 60   END DATE:  12.8.2020  CODE  TODAY MINUTES TODAY UNITS    44006 Fluidotherapy 15 1    82212 Manual 20 1    25515 Therapeutic Ex 25 2    81444 Therapeutic Activity      51870 ADL/COMP Tech Train      01138 Neuromuscular Re-Ed      96364 OrthoManagementTraining       Modality:       Other                           TOTAL  [place], [place] [id number]

## 2020-10-27 ENCOUNTER — HOSPITAL ENCOUNTER (OUTPATIENT)
Dept: OCCUPATIONAL THERAPY | Age: 75
Setting detail: THERAPIES SERIES
Discharge: HOME | End: 2020-10-27
Payer: MEDICARE

## 2020-10-27 PROCEDURE — 97022 WHIRLPOOL THERAPY: CPT

## 2020-10-27 PROCEDURE — 97110 THERAPEUTIC EXERCISES: CPT

## 2020-10-27 PROCEDURE — 97140 MANUAL THERAPY 1/> REGIONS: CPT

## 2020-10-27 NOTE — PROGRESS NOTES
[facility] [place]     OCCUPATIONAL THERAPY PROGRESS NOTE    [facility]  [address]  [place]   Phone: [phone]   Fax: [phone]     Date:  10/27/2020  Initial Evaluation Date: 10/8/2020 Evaluating Therapist: ROSALBA Manning    Patient Name:  Joe Messina    :  1945  Restrictions/Precautions:  Per protocol, low fall risk;   POST OPERATIVE PLAN  2wk:  Forearm based opponens splint.  Begin immediate wrist/thumb ROM with OT without restrictions  4wk:  Hand based splint   Around 10/20/2020  6wk:  DC rigit splint - comfort cool  Around 11/3/2020  8wk: Sayra House strengthening  Around 2020    Diagnosis:  Revision of cmc arthoplasty left hand                                                      Insurance/Certification information:  Medicare/ medical mutual  Referring Practitioner:  Dr. Rupa RAMIREZ  Referring Practitioner specific orders: see protocol in chart. Date of Surgery/Injury: , ( 4 weeks post op)  Plan of care signed (Y/N):  no  Visit# / total visits:    10th Visit Reassessment:     Pain Level:  0/10 while in splint, mild, aching, tight (pulling) and with paresthesia    Subjective: Seen 1/2 scheduled visits. Pleased with progress. Pain is minimal.  Splint compliance appears good. Next week she will transition to comfort cool. Objective:  Week 5:  Continue week 4 splinting. Engaged patient in the following with focus on ROM, scar management, functional activity tolerance, strengthening when allowed, splinting per protocol. Reassessment at or by 10th visit. INTERVENTION: CPT Code Done Today REPS/TIME: SPECIFICS/COMMENTS:   Modality:         Fluidotherapy 97022 x 15 min *Affected UE: Completes x15 min to promote tissue healing, skin desensitization, reduce inflammation, and decrease pain.   Actively completed SROM in all available planes with holds at end range during treatment [place] 22042       EStim - attended 75340       Ionto 32195      Ultrasound 28934       [facility]       Manual Therapy: 77284       Scar Massage  x   to LUE at incision sites on dorsal surface of hand and thumb   Soft Tissue  x   to LUE with MFR with focus on thena   Joint Mobilization/Trigger Point release, etc.        PROM                        Therapeutic Exercises: 27369       AAROM        AROM  x   2 sets x15 each exercise: MCP and IP joint blocking, radial and palmar abduction, digit flexion/extension. Added to HEP with handout issue. Facilitated Stretching       Wristisizer  x 20 reps  with elbow on table for wrist isolation                                      Therapeutic Activity: 18436      FM pinch and place   11 min Peg board with gathering pins and placing and removing multipules   Sensory retraining/pinch  x 11 min Graded particles in textured medium find and  with three point pinch                        ADL Retraining 42338                     Other:       Tg     Size D with doubling up over hand to assist with compression to dorsal    Splint adjustment  x  Adjusted hand based splint for comfort 10/27/20  Cut splint to a hand based splint and adjusted for a more comfortable fit. Pt instructed to continue splint wear 24/7 except for exercises and hygiene. Splinting/Ortho Mgmt 35173      HEP  x  Throughout Session with instructions and handouts as needed   Assessment: Patient shows potential to progress with stated goals and will be educated on HEP and provided written handouts as needed throughout their care. No pain with exercises. Rom cont's to progress. Pt. can now flatten hand on tabletop. Not wearing compression TG . States it was uncomfortable.      Initial UE Assessment:      Left Upper Extremity ROM   KEY: E/F     AROM(PROM)            10/08       WRIST Extension 60-75*/ Flexion 60-80* 60/45         Radial Deviation 20-25*          Ulnar Deviation 30-39* Left Hand ROM          10/20/2020  Thumb AROM  (PROM)          CMC  E/F :           MP E/F : 0/50-60' 0/30         IP E/F : 0/60-80' 0/45         Radial abduction:   42*        Palmar Abduction:   48*           Pt is making Fair + progress toward stated plan of care. -Rehab Potential: Good  -Requires OT Follow Up: Yes    Goal Progress:   1) Patient will demonstrate good understanding of home program with good accuracy. 2) Patient will demonstrate increased active/passive range of motion of their affected extremity/hand to [facility] for ADL/IADL completion. 3) Patient will demonstrate increased /pinch strength of at least 10 / 5 pinch pounds of their affected hand. Will assess when protocol permitted  4) Patient to report decreased pain in their affected hand/upper extremity from 0-4/10 to 0-2/10 or less with resistive functional use. 5) Increase in fine motor function to WN by evidenced by completing 9 hole peg testing under 30 seconds. Will assess at 6 weeks post op  6) Patient to report 100% compliance with their splint wear, care, and precautions if needed. 7) Patient will be knowledgeable of edema control techniques as evident with decreases from mild to none. 8) Patient will demonstrate a non-tender/non-adherent scar. 9) Patient will report ADL functions same as prior to diagnosis of cmc arthroplasty. 10) Patient will decrease QuickDASH score of 61.36% to 50% points less for increased participation in daily functional activities.      Pt. Education:  [x] Yes  [] No  [x] Reviewed Prior HEP/Ed  Method of Education: [x] Verbal  [x] Demo  [] Written  Comprehension of Education:  [x] Verbalizes understanding. [x] Demonstrates understanding. [x] Needs review at next sesion  [] Demonstrates/verbalizes HEP/Ed previously given. Plan:   [x]  Continues Plan of care: Treatment delivered based on POC and graduated to patient's progress.           Patient education continues at each visit to obtain maximum benefit from skilled OT intervention. []  Alter Plan of care:   []  Discharge:    JGGFH-10 Screening completed upon entering facility and patient cleared for treatment today. Pt followed all protocols set forth by [facility] for Outpatient services. Patient has been made aware of all new hygiene protocols due to COVID-19 set forth by [facility] Outpatient services and agrees to abide by all protocols.  Current Temp: 96.3      Billing:    TIME IN: 3:00 pm   TIME OUT: 4:00 pm  TOTAL TREATMENT TIME: 60   END DATE:  12.8.2020  CODE  TODAY MINUTES TODAY UNITS    76181 Fluidotherapy 15 1    94404 Manual 20 1    97091 Therapeutic Ex 25 2    75140 Therapeutic Activity      35193 ADL/COMP Tech Train      92555 Neuromuscular Re-Ed      07851 OrthoManagementTraining       Modality:       Other                           TOTAL  60 6952 [address],  RAMIREZ/L [id number]

## 2020-10-29 ENCOUNTER — HOSPITAL ENCOUNTER (OUTPATIENT)
Dept: OCCUPATIONAL THERAPY | Age: 75
Setting detail: THERAPIES SERIES
Discharge: HOME | End: 2020-10-29
Payer: MEDICARE

## 2020-10-29 PROCEDURE — 97140 MANUAL THERAPY 1/> REGIONS: CPT

## 2020-10-29 PROCEDURE — 97110 THERAPEUTIC EXERCISES: CPT

## 2020-10-29 PROCEDURE — 97022 WHIRLPOOL THERAPY: CPT

## 2020-10-29 PROCEDURE — 97530 THERAPEUTIC ACTIVITIES: CPT

## 2020-10-29 NOTE — PROGRESS NOTES
[address] [facility],4Th Floor     OCCUPATIONAL THERAPY PROGRESS NOTE    [facility]  [address]  Cecelia Zamora   Phone: [phone]   Fax: [phone]     Date:  10/29/2020  Initial Evaluation Date: 10/8/2020 Evaluating Therapist: ROSALBA Aguilar    Patient Name:  Javier Montiel    :  1945  Restrictions/Precautions:  Per protocol, low fall risk;   POST OPERATIVE PLAN  2wk:  Forearm based opponens splint.  Begin immediate wrist/thumb ROM with OT without restrictions  4wk:  Hand based splint   Around 10/20/2020  6wk:  DC rigit splint - comfort cool  Around 11/3/2020  8wk: Mariama Duff strengthening  Around 2020    Diagnosis:  Revision of cmc arthoplasty left hand                                                      Insurance/Certification information:  Medicare/ medical [place]  Referring Practitioner:  Dr. Marjorie RAMIREZ  Referring Practitioner specific orders: see protocol in chart. Date of Surgery/Injury: , ( 4 weeks post op)  Plan of care signed (Y/N):  no  Visit# / total visits:    10th Visit Reassessment:     Pain Level:  0/10 while in splint, mild, aching, tight (pulling) and with paresthesia    Subjective: Seen 2/2 scheduled visits. Pleased with progress. States she can not wait for the cc splint use. Pain is minimal.  Splint compliance appears good. Next week she will transition to comfort cool. Objective:  Week 5:  Continue week 4 splinting/ROM per protocol. Engaged patient in the following with focus on ROM, scar management, functional activity tolerance, strengthening when allowed, splinting per protocol. Reassessment at or by 10th visit. INTERVENTION: CPT Code Done Today REPS/TIME: SPECIFICS/COMMENTS:   Modality:         Fluidotherapy 97022 x 15 min *Affected UE: Completes x15 min to promote tissue healing, skin desensitization, reduce inflammation, and decrease pain.   Actively completed SROM Extension 60-75*/ Flexion 60-80* 60/45         Radial Deviation 20-25*          Ulnar Deviation 30-39*             Left Hand ROM          10/20/2020  Thumb AROM  (PROM)          CMC  E/F :           MP E/F : 0/50-60' 0/30         IP E/F : 0/60-80' 0/45         Radial abduction:   42*        Palmar Abduction:   48*           Pt is making Fair + progress toward stated plan of care. -Rehab Potential: Good  -Requires OT Follow Up: Yes    Goal Progress:   1) Patient will demonstrate good understanding of home program with good accuracy. 2) Patient will demonstrate increased active/passive range of motion of their affected extremity/hand to [facility] for ADL/IADL completion. 3) Patient will demonstrate increased /pinch strength of at least 10 / 5 pinch pounds of their affected hand. Will assess when protocol permitted  4) Patient to report decreased pain in their affected hand/upper extremity from 0-4/10 to 0-2/10 or less with resistive functional use. 5) Increase in fine motor function to WNLs by evidenced by completing 9 hole peg testing under 30 seconds. Will assess at 6 weeks post op  6) Patient to report 100% compliance with their splint wear, care, and precautions if needed. 7) Patient will be knowledgeable of edema control techniques as evident with decreases from mild to none. 8) Patient will demonstrate a non-tender/non-adherent scar. 9) Patient will report ADL functions same as prior to diagnosis of cmc arthroplasty. 10) Patient will decrease QuickDASH score of 61.36% to 50% points less for increased participation in daily functional activities.      Pt. Education:  [x] Yes  [] No  [x] Reviewed Prior HEP/Ed  Method of Education: [x] Verbal  [x] Demo  [] Written  Comprehension of Education:  [x] Verbalizes understanding. [x] Demonstrates understanding. [x] Needs review at next sesion  [] Demonstrates/verbalizes HEP/Ed previously given.      Plan:   [x]  Continues Plan of care: Treatment delivered based on POC and graduated to patient's progress. Patient education continues at each visit to obtain maximum benefit from skilled OT intervention. []  Alter Plan of care:   []  Discharge:    [id number] Screening completed upon entering facility and patient cleared for treatment today. Pt followed all protocols set forth by [facility] for Outpatient services. Patient has been made aware of all new hygiene protocols due to COVID-19 set forth by [facility] Outpatient services and agrees to abide by all protocols.  Current Temp: 96.3      Billing:    TIME IN: 3:00 pm   TIME OUT: 4:00 pm  TOTAL TREATMENT TIME: 60   END DATE:  12.8.2020  CODE  TODAY MINUTES TODAY UNITS    83658 Fluidotherapy 10 1    43698 Manual 20 1    62379 Therapeutic Ex 15 1    59577 Therapeutic Activity 15 1    75960 ADL/COMP Tech Train      18328 Neuromuscular Re-Ed      76365 OrthoManagementTraining       Modality:       Other                           TOTAL  [address],  RAMIREZ/L [id number]

## 2020-11-03 ENCOUNTER — HOSPITAL ENCOUNTER (OUTPATIENT)
Dept: OCCUPATIONAL THERAPY | Age: 75
Setting detail: THERAPIES SERIES
Discharge: HOME | End: 2020-11-03
Payer: MEDICARE

## 2020-11-05 ENCOUNTER — HOSPITAL ENCOUNTER (OUTPATIENT)
Dept: OCCUPATIONAL THERAPY | Age: 75
Setting detail: THERAPIES SERIES
Discharge: HOME | End: 2020-11-05
Payer: MEDICARE

## 2020-11-05 NOTE — PROGRESS NOTES
111 [facility],4Th Floor    Outpatient Occupational Therapy    Phone: [phone]   Fax: [phone]     HOLD NOTE    Date:  2020    Patient Name:  Osiel Alcantara    :  1945     PT ID: [id number]    Total missed visits including today: 1    Total number of no shows: 0    For today's appointment patient:    [x]  Cancelled & Rescheduled appointment  []  No-show     Reason given by patient:  []  Patient ill  []  Conflicting appointment  []  No transportation    []  Conflict with work  []  No reason given  [x]  Other:       Comments:  Patient called to state she has seen physician of record and he is requesting a hold on therapy due to WNL ROM at this time. He will send her back for strengthening at 6-8 weeks. Pleased with progress. Patient on hold until contacted by either physician or patient to resume treatment with strengthening.         Electronically signed by:  ASHLEY Hu/SHAMEKA  [id number] SAPNA

## 2020-12-16 NOTE — PROGRESS NOTES
Freya [address] THERAPY PROGRESS NOTE    [facility]  [address] [facility]  [place], [address]   Phone: [phone]   Fax: [phone]     Marly Hitchcock    12/16/2020    Dear Dr. Anitha Amato MD :    This is to inform you that, as per [facility] your patient, Tom Heredia, [id number],   is as of todays date being discharged from Occupational Therapy secondary to the following reasons:      1. If an Outpatient Occupational Therapy patient misses three consecutive scheduled appointments without any prior notification, he or she will be discharged from Occupational Therapy services. A new prescription will be necessary to resume Occupational Therapy. 2.  Patient has been formally discharged per physician of record. No additional sessions required. If you have any questions, feel free to call us at [address] [place] [address], [phone]. Thank you     ASHLEY Julian/SHAMEKA  [address] [place] [address], [address] [phone] (FAX)      ______________________________  ___________  Physician      Date    I have read the above notification and understand this discharge of POC.

## 2020-12-29 ENCOUNTER — HOSPITAL ENCOUNTER (OUTPATIENT)
Dept: OCCUPATIONAL THERAPY | Age: 75
Setting detail: THERAPIES SERIES
Discharge: HOME | End: 2020-12-29
Payer: MEDICARE

## 2021-02-04 ENCOUNTER — IMMUNIZATION (OUTPATIENT)
Dept: PRIMARY CARE CLINIC | Age: 76
End: 2021-02-04
Payer: MEDICARE

## 2021-02-04 PROCEDURE — 0001A COVID-19, PFIZER VACCINE 30MCG/0.3ML DOSE: CPT | Performed by: NURSE PRACTITIONER

## 2021-02-04 PROCEDURE — 91300 COVID-19, PFIZER VACCINE 30MCG/0.3ML DOSE: CPT | Performed by: NURSE PRACTITIONER

## 2021-03-01 ENCOUNTER — IMMUNIZATION (OUTPATIENT)
Dept: PRIMARY CARE CLINIC | Age: 76
End: 2021-03-01
Payer: MEDICARE

## 2021-03-01 PROCEDURE — 91300 COVID-19, PFIZER VACCINE 30MCG/0.3ML DOSE: CPT | Performed by: NURSE PRACTITIONER

## 2021-03-01 PROCEDURE — 0002A COVID-19, PFIZER VACCINE 30MCG/0.3ML DOSE: CPT | Performed by: NURSE PRACTITIONER

## 2023-07-17 ENCOUNTER — OFFICE VISIT (OUTPATIENT)
Dept: ENT CLINIC | Age: 78
End: 2023-07-17
Payer: MEDICARE

## 2023-07-17 VITALS — HEIGHT: 64 IN | WEIGHT: 110 LBS | BODY MASS INDEX: 18.78 KG/M2

## 2023-07-17 DIAGNOSIS — H69.03 PATULOUS EUSTACHIAN TUBE OF BOTH EARS: Primary | ICD-10-CM

## 2023-07-17 DIAGNOSIS — J32.4 CHRONIC PANSINUSITIS: ICD-10-CM

## 2023-07-17 PROCEDURE — 1123F ACP DISCUSS/DSCN MKR DOCD: CPT | Performed by: OTOLARYNGOLOGY

## 2023-07-17 PROCEDURE — 1036F TOBACCO NON-USER: CPT | Performed by: OTOLARYNGOLOGY

## 2023-07-17 PROCEDURE — 99204 OFFICE O/P NEW MOD 45 MIN: CPT | Performed by: OTOLARYNGOLOGY

## 2023-07-17 PROCEDURE — G8400 PT W/DXA NO RESULTS DOC: HCPCS | Performed by: OTOLARYNGOLOGY

## 2023-07-17 PROCEDURE — G8427 DOCREV CUR MEDS BY ELIG CLIN: HCPCS | Performed by: OTOLARYNGOLOGY

## 2023-07-17 PROCEDURE — 1090F PRES/ABSN URINE INCON ASSESS: CPT | Performed by: OTOLARYNGOLOGY

## 2023-07-17 PROCEDURE — G8420 CALC BMI NORM PARAMETERS: HCPCS | Performed by: OTOLARYNGOLOGY

## 2023-07-17 RX ORDER — FLUTICASONE PROPIONATE 50 MCG
2 SPRAY, SUSPENSION (ML) NASAL DAILY
Qty: 1 EACH | Refills: 3 | Status: SHIPPED | OUTPATIENT
Start: 2023-07-17

## 2023-07-17 ASSESSMENT — ENCOUNTER SYMPTOMS
VOICE CHANGE: 0
SHORTNESS OF BREATH: 0
VOMITING: 0
RHINORRHEA: 1
COUGH: 0
SORE THROAT: 0
TROUBLE SWALLOWING: 0

## 2023-07-17 NOTE — PATIENT INSTRUCTIONS
Plain nasal saline 2 sprays each nostril 2-3 times daily. ([employer] makes an aerosol can that is a very gentle mist that I recommend)     Please start Flonase nasal spray 2 sprays each nostril daily. ( You will want to use the nasal saline 15-20 minutes prior to using the medicated nasal spray to prevent washing it out)    When using the Prescription Nasal Spray use your right hand to spray into the left nostril and the left hand to spray into the right nostril. Do Not Sniff after spraying. This must be used daily to get improvement of symptoms which takes at least 2-3 weeks to see any results. May take up to six weeks to get the best improvement.

## 2023-07-17 NOTE — PROGRESS NOTES
normal. Decreased hearing noted. There is no impacted cerumen. Left Ear: Tympanic membrane and ear canal normal. Decreased hearing noted. There is no impacted cerumen. Ears:      Comments: Wears bilateral hearing aids    patulous eustachian tube      Nose: Rhinorrhea present. No congestion. Right Nostril: No epistaxis. Left Nostril: No epistaxis. Right Turbinates: Not swollen. Left Turbinates: Not swollen. Mouth/Throat:      Mouth: Mucous membranes are moist. No oral lesions. Dentition: No gum lesions. Pharynx: No oropharyngeal exudate or posterior oropharyngeal erythema. Eyes:      Pupils: Pupils are equal, round, and reactive to light. Neck:      Thyroid: No thyromegaly. Trachea: No tracheal deviation. Cardiovascular:      Rate and Rhythm: Normal rate. Pulmonary:      Effort: Pulmonary effort is normal. No respiratory distress. Musculoskeletal:         General: Normal range of motion. Cervical back: Normal range of motion. Lymphadenopathy:      Cervical: No cervical adenopathy. Skin:     General: Skin is warm. Findings: No erythema. Neurological:      Mental Status: She is alert. Cranial Nerves: No cranial nerve deficit. IMPRESSION/PLAN:  1. Patulous eustachian tube of both ears  2. Chronic pansinusitis    Nasal saline 2-3 times daily  Flonase 2 sp each nostril daily  Follow up 6 weeks     Dr. Dwaine Simms Otolaryngology Residency  Associate Clinical Professor:  Trae Gonzales [facility]  1945      I have discussed the case, including pertinent history and exam findings with the resident. I have seen and examined the patient and the key elements of the encounter have been performed by me. I agree with the assessment, plan and orders as documented by the resident.       Patient here for follow up of

## 2023-08-28 ENCOUNTER — OFFICE VISIT (OUTPATIENT)
Dept: ENT CLINIC | Age: 78
End: 2023-08-28
Payer: MEDICARE

## 2023-08-28 VITALS
WEIGHT: 107 LBS | BODY MASS INDEX: 18.27 KG/M2 | SYSTOLIC BLOOD PRESSURE: 157 MMHG | RESPIRATION RATE: 12 BRPM | DIASTOLIC BLOOD PRESSURE: 75 MMHG | HEIGHT: 64 IN | HEART RATE: 92 BPM

## 2023-08-28 DIAGNOSIS — H69.03 PATULOUS EUSTACHIAN TUBE OF BOTH EARS: Primary | ICD-10-CM

## 2023-08-28 DIAGNOSIS — J32.4 CHRONIC PANSINUSITIS: ICD-10-CM

## 2023-08-28 PROCEDURE — 1090F PRES/ABSN URINE INCON ASSESS: CPT | Performed by: OTOLARYNGOLOGY

## 2023-08-28 PROCEDURE — G8427 DOCREV CUR MEDS BY ELIG CLIN: HCPCS | Performed by: OTOLARYNGOLOGY

## 2023-08-28 PROCEDURE — 99213 OFFICE O/P EST LOW 20 MIN: CPT | Performed by: OTOLARYNGOLOGY

## 2023-08-28 PROCEDURE — 1036F TOBACCO NON-USER: CPT | Performed by: OTOLARYNGOLOGY

## 2023-08-28 PROCEDURE — G8400 PT W/DXA NO RESULTS DOC: HCPCS | Performed by: OTOLARYNGOLOGY

## 2023-08-28 PROCEDURE — G8420 CALC BMI NORM PARAMETERS: HCPCS | Performed by: OTOLARYNGOLOGY

## 2023-08-28 PROCEDURE — 1123F ACP DISCUSS/DSCN MKR DOCD: CPT | Performed by: OTOLARYNGOLOGY

## 2023-08-31 RX ORDER — FLUTICASONE PROPIONATE 50 MCG
2 SPRAY, SUSPENSION (ML) NASAL DAILY
Qty: 1 EACH | Refills: 3 | Status: SHIPPED | OUTPATIENT
Start: 2023-08-31

## 2023-08-31 RX ORDER — AZELASTINE 1 MG/ML
2 SPRAY, METERED NASAL 2 TIMES DAILY
Qty: 30 ML | Refills: 1 | Status: SHIPPED | OUTPATIENT
Start: 2023-08-31

## 2023-08-31 ASSESSMENT — ENCOUNTER SYMPTOMS
SHORTNESS OF BREATH: 0
SORE THROAT: 0
VOMITING: 0
SINUS PRESSURE: 0
COUGH: 0
RHINORRHEA: 1

## 2023-10-09 ENCOUNTER — OFFICE VISIT (OUTPATIENT)
Dept: ENT CLINIC | Age: 78
End: 2023-10-09
Payer: MEDICARE

## 2023-10-09 VITALS
DIASTOLIC BLOOD PRESSURE: 89 MMHG | WEIGHT: 107 LBS | BODY MASS INDEX: 18.27 KG/M2 | HEART RATE: 78 BPM | HEIGHT: 64 IN | SYSTOLIC BLOOD PRESSURE: 149 MMHG

## 2023-10-09 DIAGNOSIS — H69.03 PATULOUS EUSTACHIAN TUBE OF BOTH EARS: Primary | ICD-10-CM

## 2023-10-09 DIAGNOSIS — J32.4 CHRONIC PANSINUSITIS: ICD-10-CM

## 2023-10-09 PROCEDURE — G8427 DOCREV CUR MEDS BY ELIG CLIN: HCPCS | Performed by: OTOLARYNGOLOGY

## 2023-10-09 PROCEDURE — 1090F PRES/ABSN URINE INCON ASSESS: CPT | Performed by: OTOLARYNGOLOGY

## 2023-10-09 PROCEDURE — 1123F ACP DISCUSS/DSCN MKR DOCD: CPT | Performed by: OTOLARYNGOLOGY

## 2023-10-09 PROCEDURE — G8420 CALC BMI NORM PARAMETERS: HCPCS | Performed by: OTOLARYNGOLOGY

## 2023-10-09 PROCEDURE — G8484 FLU IMMUNIZE NO ADMIN: HCPCS | Performed by: OTOLARYNGOLOGY

## 2023-10-09 PROCEDURE — G8400 PT W/DXA NO RESULTS DOC: HCPCS | Performed by: OTOLARYNGOLOGY

## 2023-10-09 PROCEDURE — 99213 OFFICE O/P EST LOW 20 MIN: CPT | Performed by: OTOLARYNGOLOGY

## 2023-10-09 PROCEDURE — 1036F TOBACCO NON-USER: CPT | Performed by: OTOLARYNGOLOGY

## 2023-10-09 ASSESSMENT — ENCOUNTER SYMPTOMS
RESPIRATORY NEGATIVE: 1
GASTROINTESTINAL NEGATIVE: 1
SINUS PRESSURE: 0
RHINORRHEA: 0
EYES NEGATIVE: 1

## 2023-10-09 NOTE — PROGRESS NOTES
[facility] Otolaryngology  Dr. Felicitas Desai. TARA Masters Ms.Ed. New Consult       Patient Name:  Trinity Laura  :  1945     CHIEF C/O:    Chief Complaint   Patient presents with    Follow-up     6 wk f/u, states she notices the most improvement with her night time spray, muffled hearing, ETD       HISTORY OBTAINED FROM:  patient    HISTORY OF PRESENT ILLNESS:       Kathy Hong is a 69y.o. year old female, here today for follow up for ETD and nasal obstructive symptoms. Patient reports breathing better after starting Flonase and Astelin. Main complaint today is aural fullness and echoing in the ears bilaterally despite her medical therapy. No other issues noted.       Past Medical History:   Diagnosis Date    Arthritis     Fibromyalgia     Hypothyroid     Motion sickness     Osteoporosis     Pneumonia     PONV (postoperative nausea and vomiting)     Primary osteoarthritis of both first carpometacarpal joints     SCHED[facility]  [facility] [address] [facility]      Past Surgical History:   Procedure Laterality Date    BACK SURGERY  2018    L4 L5 fusion    BREAST SURGERY Bilateral     breast tissue removed and implants placed    CHOLECYSTECTOMY      COLONOSCOPY      ENDOSCOPY, COLON, DIAGNOSTIC      EYE SURGERY Bilateral     CATARACT SURGERY    HAND SURGERY Right     HAND SURGERY Left 2019    CMC arthroplasty    HAND SURGERY Left 2020    L thumb carpometacarpal arthroplasty    OVARY REMOVAL      SKIN BIOPSY      benign       Current Outpatient Medications:     azelastine (ASTELIN) 0.1 % nasal spray, 2 sprays by Nasal route 2 times daily Use in each nostril as directed, Disp: 30 mL, Rfl: 1    fluticasone (FLONASE) 50 MCG/ACT nasal spray, 2 sprays by Nasal route daily 2 sprays each nostril once daily, Disp: 1 each, Rfl: 3    fluticasone (FLONASE) 50 MCG/ACT nasal spray, 2 sprays by Nasal route daily 2 sprays each nostril once daily, Disp: 1 each, Rfl: 3    naproxen

## 2024-01-08 DIAGNOSIS — J32.4 CHRONIC PANSINUSITIS: Primary | ICD-10-CM

## 2024-01-08 RX ORDER — AZELASTINE 1 MG/ML
2 SPRAY, METERED NASAL 2 TIMES DAILY
Qty: 30 ML | Refills: 1 | Status: SHIPPED | OUTPATIENT
Start: 2024-01-08

## 2024-01-08 RX ORDER — AZELASTINE 1 MG/ML
2 SPRAY, METERED NASAL 2 TIMES DAILY
Qty: 120 ML | Refills: 1 | Status: SHIPPED | OUTPATIENT
Start: 2024-01-08

## 2024-01-08 NOTE — TELEPHONE ENCOUNTER
Original prescription went to wrong pharmacy, has been cancelled with pharmacy  Electronically signed by Trish Kingsley LPN on 1/8/2024 at 3:46 PM

## 2024-01-08 NOTE — TELEPHONE ENCOUNTER
Medication was sent to wrong pharmacy initially, new prescription has been pended for correct pharmacy [facility]  Electronically signed by Trish Kingsley LPN on 1/8/2024 at 3:47 PM

## 2024-05-08 ENCOUNTER — OFFICE VISIT (OUTPATIENT)
Dept: SURGERY | Age: 79
End: 2024-05-08
Payer: MEDICARE

## 2024-05-08 VITALS
TEMPERATURE: 97.9 F | HEART RATE: 109 BPM | HEIGHT: 64 IN | WEIGHT: 107.2 LBS | DIASTOLIC BLOOD PRESSURE: 85 MMHG | SYSTOLIC BLOOD PRESSURE: 161 MMHG | BODY MASS INDEX: 18.3 KG/M2

## 2024-05-08 DIAGNOSIS — Z12.39 SCREENING BREAST EXAMINATION: Primary | ICD-10-CM

## 2024-05-08 PROCEDURE — 1123F ACP DISCUSS/DSCN MKR DOCD: CPT | Performed by: PLASTIC SURGERY

## 2024-05-08 PROCEDURE — G8427 DOCREV CUR MEDS BY ELIG CLIN: HCPCS | Performed by: PLASTIC SURGERY

## 2024-05-08 PROCEDURE — 99203 OFFICE O/P NEW LOW 30 MIN: CPT | Performed by: PLASTIC SURGERY

## 2024-05-08 PROCEDURE — G8400 PT W/DXA NO RESULTS DOC: HCPCS | Performed by: PLASTIC SURGERY

## 2024-05-08 PROCEDURE — 1090F PRES/ABSN URINE INCON ASSESS: CPT | Performed by: PLASTIC SURGERY

## 2024-05-08 PROCEDURE — 1036F TOBACCO NON-USER: CPT | Performed by: PLASTIC SURGERY

## 2024-05-08 PROCEDURE — G8420 CALC BMI NORM PARAMETERS: HCPCS | Performed by: PLASTIC SURGERY

## 2024-05-08 RX ORDER — MELOXICAM 15 MG/1
15 TABLET ORAL DAILY
COMMUNITY

## 2024-05-08 NOTE — PROGRESS NOTES
CHOLECYSTECTOMY      COLONOSCOPY      ENDOSCOPY, COLON, DIAGNOSTIC      EYE SURGERY Bilateral     CATARACT SURGERY    HAND SURGERY Right     HAND SURGERY Left 12/06/2019    CMC arthroplasty    HAND SURGERY Left 09/22/2020    L thumb carpometacarpal arthroplasty    OVARY REMOVAL      SINUS SURGERY      SKIN BIOPSY      benign     Current Medications:   No current facility-administered medications for this visit.  Allergies:  Latex, Codeine, and Seasonal    Social History:   Social History     Socioeconomic History    Marital status:      Spouse name: Not on file    Number of children: Not on file    Years of education: Not on file    Highest education level: Not on file   Occupational History    Not on file   Tobacco Use    Smoking status: Never    Smokeless tobacco: Never   Vaping Use    Vaping Use: Never used   Substance and Sexual Activity    Alcohol use: Not Currently    Drug use: No    Sexual activity: Not on file   Other Topics Concern    Not on file   Social History Narrative    Not on file     Social Determinants of Health     Financial Resource Strain: Not on file   Food Insecurity: Not on file   Transportation Needs: Not on file   Physical Activity: Not on file   Stress: Not on file   Social Connections: Not on file   Intimate Partner Violence: Not on file   Housing Stability: Not on file     Family History:   Family History   Problem Relation Age of Onset    No Known Problems Mother     No Known Problems Father        REVIEW OF SYSTEMS:    CONSTITUTIONAL:  negative  RESPIRATORY:  negative  CARDIOVASCULAR:  negative  GASTROINTESTINAL:  negative  BEHAVIOR/PSYCH:  negative    PHYSICAL EXAM:        VITALS:  BP (!) 161/85 (Site: Left Upper Arm, Position: Sitting, Cuff Size: Medium Adult)   Pulse (!) 109   Temp 97.9 °F (36.6 °C) (Temporal)   Ht 1.613 m (5' 3.5\")   Wt 48.6 kg (107 lb 3.2 oz)   BMI 18.69 kg/m²   CONSTITUTIONAL:  awake, alert, cooperative, no apparent distress, and appears stated

## 2024-06-07 ENCOUNTER — HOSPITAL ENCOUNTER (OUTPATIENT)
Dept: GENERAL RADIOLOGY | Age: 79
End: 2024-06-07
Payer: MEDICARE

## 2024-06-07 VITALS — WEIGHT: 106 LBS | HEIGHT: 62 IN | BODY MASS INDEX: 19.51 KG/M2

## 2024-06-07 DIAGNOSIS — Z12.39 SCREENING BREAST EXAMINATION: ICD-10-CM

## 2024-06-07 PROCEDURE — 77067 SCR MAMMO BI INCL CAD: CPT

## 2024-06-10 ENCOUNTER — TELEPHONE (OUTPATIENT)
Dept: GENERAL RADIOLOGY | Age: 79
End: 2024-06-10

## 2024-06-10 DIAGNOSIS — R92.8 ABNORMAL MAMMOGRAM: Primary | ICD-10-CM

## 2024-06-10 NOTE — TELEPHONE ENCOUNTER
Call to patient in reference to her mammogram performed at [facility] on June 7, 2024.    Instructed patient that the radiologist has recommended some additional breast imaging in order to make a final determination/result. Informed her that a Rx has been obtained by the ordering provider. Next, a  from [facility] will contact her to schedule the additional imaging study/studies. Verbalizes understanding and is agreeable to proceed.

## 2024-06-12 ENCOUNTER — TELEPHONE (OUTPATIENT)
Dept: SURGERY | Age: 79
End: 2024-06-12

## 2024-06-12 NOTE — TELEPHONE ENCOUNTER
Patient is checking if she can have her ultrasound done sooner,she is interested for the surgery date 6/25/24, would like that date held.  She will be calling the office back today to give us the confirmation on the date of and ultrasound and the surgery date.

## 2024-06-12 NOTE — TELEPHONE ENCOUNTER
Patient called back and got the US moved to 6/21/24, she would like 6/25/24 as her surgery date. Waiting on kristen to let me know if this is okay. Reaching out to the patient Monday.

## 2024-06-21 ENCOUNTER — HOSPITAL ENCOUNTER (OUTPATIENT)
Dept: GENERAL RADIOLOGY | Age: 79
End: 2024-06-21
Payer: MEDICARE

## 2024-06-21 DIAGNOSIS — R92.8 ABNORMAL MAMMOGRAM: ICD-10-CM

## 2024-06-21 PROCEDURE — 76642 ULTRASOUND BREAST LIMITED: CPT

## 2024-06-28 ENCOUNTER — OFFICE VISIT (OUTPATIENT)
Dept: SURGERY | Age: 79
End: 2024-06-28

## 2024-06-28 VITALS — TEMPERATURE: 97.3 F

## 2024-06-28 DIAGNOSIS — Z98.82 S/P SALINE BREAST IMPLANT: Primary | ICD-10-CM

## 2024-06-28 DIAGNOSIS — Z98.890 HISTORY OF LUMPECTOMY OF BOTH BREASTS: ICD-10-CM

## 2024-06-28 PROCEDURE — 99213 OFFICE O/P EST LOW 20 MIN: CPT | Performed by: PLASTIC SURGERY

## 2024-06-28 PROCEDURE — 1123F ACP DISCUSS/DSCN MKR DOCD: CPT | Performed by: PLASTIC SURGERY

## 2024-06-28 RX ORDER — MONTELUKAST SODIUM 10 MG/1
10 TABLET ORAL NIGHTLY
COMMUNITY

## 2024-06-28 NOTE — PROGRESS NOTES
Subjective:    Follow up today from initial presentation of ruptured right breast implant, saline. Denies fever, nausea, vomiting, leg pain or swelling, pain is absent. She has a history of bilateral breast reconstruction.  The patient states that she noticed a change to her right breast implant over the last several months becoming more deflated.  She was seen by her primary care physician where she had an ultrasound done to the right breast.  She states that this exam was undefined and sent to our office for examination.  She states that her breast implants have been in place since around the year 2000 or 2001.  She states that prior to these implants being placed she had bilateral silicone breast implants for approximately 10 years.  She states that she had these due to very dense breasts where she had \"lumpectomies to both breasts\".  She states that after her lumpectomies she had silicone breast implants.  At after about 10 years the silicone ruptured and she had to have the implants removed.  She denies any pain or tenderness to the bilateral breast at this time.  She states she does not require mammograms any further at this time.       PHYSICAL EXAM:        VITALS:  Temp 97.3 °F (36.3 °C) (Infrared)   CONSTITUTIONAL:  awake, alert, cooperative, no apparent distress, and appears stated age  EYES: PERRLA, EOMI, no signs of occular infection  MUSCULOSKELETAL: negative for flaccid muscle tone or spastic movements.  NEURO: Cranial nerves II-XII grossly intact. No signs of agitated mood.   LUNGS:  No increased work of breathing, good air exchange, clear to auscultation bilaterally, no crackles or wheezing  CARDIOVASCULAR:  Normal apical impulse, regular rate and rhythm,   ABDOMEN:  Normal bowel sounds, soft, non-distended, non-tender,     LEFT BREAST: Rash is not noted, There are no masses palpated, no axillary lymphadenopathy, no nipple discharge. No breast pain.  There are previous scars circumferential to her

## 2024-07-05 ENCOUNTER — TELEPHONE (OUTPATIENT)
Dept: SURGERY | Age: 79
End: 2024-07-05

## 2024-07-05 NOTE — TELEPHONE ENCOUNTER
Patient stated stress next Monday, she will call the office to update us.    Our office will need Cardiology Clearance prior to outpatient procedure once it is scheduled.      She had stated next Tuesdays surgery was cancelled due to stress test scheduled.

## 2024-07-18 ENCOUNTER — PREP FOR PROCEDURE (OUTPATIENT)
Dept: SURGERY | Age: 79
End: 2024-07-18

## 2024-07-18 DIAGNOSIS — Z98.82 HISTORY OF BILATERAL BREAST IMPLANTS: ICD-10-CM

## 2024-07-18 RX ORDER — IBANDRONATE SODIUM 150 MG/1
150 TABLET, FILM COATED ORAL
COMMUNITY
End: 2024-07-26 | Stop reason: ALTCHOICE

## 2024-07-24 NOTE — H&P
'67,'69,'75,'90    BREAST ENHANCEMENT SURGERY Bilateral     '91 silicone implants. 2000 saline implants    BREAST SURGERY Bilateral     breast tissue removed and saline implants placed '91    CHOLECYSTECTOMY      COLONOSCOPY      ENDOSCOPY, COLON, DIAGNOSTIC      EYE SURGERY Bilateral     CATARACT SURGERY    HAND SURGERY Right     HAND SURGERY Left 12/06/2019    CMC arthroplasty    HAND SURGERY Left 09/22/2020    L thumb carpometacarpal arthroplasty    OVARY REMOVAL      SINUS SURGERY      SKIN BIOPSY      benign     Current Medications:   No current facility-administered medications for this encounter.     Current Outpatient Medications   Medication Sig Dispense Refill    ibandronate (BONIVA) 150 MG tablet Take 1 tablet by mouth every 30 days      montelukast (SINGULAIR) 10 MG tablet Take 1 tablet by mouth nightly      meloxicam (MOBIC) 15 MG tablet Take 1 tablet by mouth daily      azelastine (ASTELIN) 0.1 % nasal spray 2 sprays by Nasal route 2 times daily Use in each nostril as directed (Patient not taking: Reported on 5/8/2024) 30 mL 1    azelastine (ASTELIN) 0.1 % nasal spray 2 sprays by Nasal route 2 times daily Use in each nostril as directed (Patient not taking: Reported on 5/8/2024) 120 mL 1    fluticasone (FLONASE) 50 MCG/ACT nasal spray 2 sprays by Nasal route daily 2 sprays each nostril once daily 1 each 3    fluticasone (FLONASE) 50 MCG/ACT nasal spray 2 sprays by Nasal route daily 2 sprays each nostril once daily (Patient not taking: Reported on 6/28/2024) 1 each 3    naproxen (NAPROSYN) 250 MG tablet Take 1 tablet by mouth 2 times daily (with meals)      cyclobenzaprine (FLEXERIL) 10 MG tablet Take 0.5 tablets by mouth nightly (Patient not taking: Reported on 6/28/2024)      loratadine (CLARITIN) 10 MG tablet Take 1 tablet by mouth daily      Biotin 80815 MCG TABS Take 1 tablet by mouth 2 times daily       Calcium Carbonate-Vitamin D (CALTRATE 600+D PO) Take 1 tablet by mouth daily       Vitamin  30,000 textured implants. Thus far, there have been no confirmed cases of FEMI-ALCL in women who have had only “smooth-surface” breast implants.  The FDA is not recommending removal of textured implants. Rather, the FDA recommends, as do I, that every woman conduct regular self-examination.   The patient was educated that if she does develop FEMI-ALCL she may require additional treatment such as radiation or chemotherapy along with removal of the breast implant and surrounding scar tissue.     The risks, benefits and options were discussed with the pt. The risks included but not limited to pain, bleeding, infection, heavy scarring, damage to surrounding structures,  and need for further procedures. Other risks including but not limited to asymmetry, loss of nipple or/and areola, loss of sensation to nipple and areola, inability to breast feed, seroma, hematoma, implant failure, capsular contracture, and fat necrosis were also discussed with the patient. All of her questions were answered to her satisfaction and she agrees to proceed with the operation.     Surgical plan: Removal of bilateral saline breast implants, replacement with bilateral saline breast implants     Target breast implant size 365 cc saline implants bilaterally    Attestation    No change in patient's H & P. I have reviewed the procedure with the patient as well as the risk and benefits. They have no further questions and agree to proceed with surgery.    Trell Segal MD   8:16 AM  8/1/2024

## 2024-07-26 NOTE — PROGRESS NOTES
[facility]   PRE-ADMISSION TESTING GENERAL INSTRUCTIONS  PAT Phone Number: [phone]      GENERAL INSTRUCTIONS:    [x] Antibacterial Soap Shower the Night before AND the morning of surgery.  [] CHG Wipes instruction sheet and wipes given.  [x] Do not wear makeup, lotions, powders, deodorant the morning of surgery.  [x] No solid food after midnight. You may have SIPS of clear liquids up until 2 hours before your arrival time to the hospital.   [x] You may brush your teeth, gargle, but do not swallow water.   [x] No tobacco products, illegal drugs, or alcohol within 24 hours of your surgery.  [x] Jewelry or valuables should not be brought to the hospital. All body and/or tongue piercing's must be removed prior to arriving to hospital. No contact lens or hair pins.   [x] Arrange transportation with a responsible adult  to and from the hospital. Arrange for someone to be with you for the remainder of the day and for 24 hours after your procedure due to having had anesthesia.          -Who will be your  for transportation? Not sure        -Who will be staying with you for 24 hrs after your procedure? Not sure  [x] Bring insurance card and photo ID.  [] Bring copy of living will or healthcare power of  papers to be placed in your electronic record.  [] Urine Pregnancy test will be preformed the day of surgery. A specimen sample may be brought from home.  [] Transfusion (Green) Bracelet: Please bring with you to hospital, day of surgery.     PARKING INSTRUCTIONS:     [x] ARRIVAL DATE & TIME: 01/01/2024 0730  [] Times are subject to change. We will contact you the business day before surgery if that were to occur.  [x] Enter into the [facility] Entrance. Two people may accompany you. Masks are not required.  [x] Parking Lot \"I\" is where you will park. It is located on the corner of [address] and [address]. The entrance is on [address].   Only one  [address] - [place]ity

## 2024-07-31 NOTE — PROGRESS NOTES
Patient notified of new arrival time for procedure tomorrow. New arrival time is now __0600__. Patient verbalized understanding.

## 2024-08-01 ENCOUNTER — HOSPITAL ENCOUNTER (OUTPATIENT)
Age: 79
Setting detail: OUTPATIENT SURGERY
Discharge: HOME | End: 2024-08-01
Attending: PLASTIC SURGERY | Admitting: PLASTIC SURGERY
Payer: MEDICARE

## 2024-08-01 ENCOUNTER — ANESTHESIA (OUTPATIENT)
Dept: OPERATING ROOM | Age: 79
End: 2024-08-01
Payer: MEDICARE

## 2024-08-01 ENCOUNTER — ANESTHESIA EVENT (OUTPATIENT)
Dept: OPERATING ROOM | Age: 79
End: 2024-08-01
Payer: MEDICARE

## 2024-08-01 VITALS
RESPIRATION RATE: 24 BRPM | HEART RATE: 77 BPM | WEIGHT: 108 LBS | TEMPERATURE: 97.4 F | HEIGHT: 63 IN | BODY MASS INDEX: 19.14 KG/M2 | OXYGEN SATURATION: 97 % | SYSTOLIC BLOOD PRESSURE: 146 MMHG | DIASTOLIC BLOOD PRESSURE: 89 MMHG

## 2024-08-01 DIAGNOSIS — G89.18 POST-OP PAIN: Primary | ICD-10-CM

## 2024-08-01 DIAGNOSIS — Z98.82 HISTORY OF BILATERAL BREAST IMPLANTS: ICD-10-CM

## 2024-08-01 PROCEDURE — 7100000010 HC PHASE II RECOVERY - FIRST 15 MIN: Performed by: PLASTIC SURGERY

## 2024-08-01 PROCEDURE — 6360000002 HC RX W HCPCS: Performed by: PLASTIC SURGERY

## 2024-08-01 PROCEDURE — 19342 INSJ/RPLCMT BRST IMPLT SEP D: CPT | Performed by: PHYSICIAN ASSISTANT

## 2024-08-01 PROCEDURE — C1789 PROSTHESIS, BREAST, IMP: HCPCS | Performed by: PLASTIC SURGERY

## 2024-08-01 PROCEDURE — 6360000002 HC RX W HCPCS: Performed by: ANESTHESIOLOGIST ASSISTANT

## 2024-08-01 PROCEDURE — 2580000003 HC RX 258: Performed by: PLASTIC SURGERY

## 2024-08-01 PROCEDURE — 88305 TISSUE EXAM BY PATHOLOGIST: CPT

## 2024-08-01 PROCEDURE — 7100000011 HC PHASE II RECOVERY - ADDTL 15 MIN: Performed by: PLASTIC SURGERY

## 2024-08-01 PROCEDURE — 19370 REVJ PERI-IMPLT CAPSULE BRST: CPT | Performed by: PLASTIC SURGERY

## 2024-08-01 PROCEDURE — 3700000001 HC ADD 15 MINUTES (ANESTHESIA): Performed by: PLASTIC SURGERY

## 2024-08-01 PROCEDURE — 2709999900 HC NON-CHARGEABLE SUPPLY: Performed by: PLASTIC SURGERY

## 2024-08-01 PROCEDURE — 2500000003 HC RX 250 WO HCPCS: Performed by: PLASTIC SURGERY

## 2024-08-01 PROCEDURE — 2500000003 HC RX 250 WO HCPCS: Performed by: ANESTHESIOLOGY

## 2024-08-01 PROCEDURE — 7100000001 HC PACU RECOVERY - ADDTL 15 MIN: Performed by: PLASTIC SURGERY

## 2024-08-01 PROCEDURE — 2720000010 HC SURG SUPPLY STERILE: Performed by: PLASTIC SURGERY

## 2024-08-01 PROCEDURE — 6370000000 HC RX 637 (ALT 250 FOR IP): Performed by: ANESTHESIOLOGY

## 2024-08-01 PROCEDURE — 3600000003 HC SURGERY LEVEL 3 BASE: Performed by: PLASTIC SURGERY

## 2024-08-01 PROCEDURE — 3700000000 HC ANESTHESIA ATTENDED CARE: Performed by: PLASTIC SURGERY

## 2024-08-01 PROCEDURE — 19342 INSJ/RPLCMT BRST IMPLT SEP D: CPT | Performed by: PLASTIC SURGERY

## 2024-08-01 PROCEDURE — 3600000013 HC SURGERY LEVEL 3 ADDTL 15MIN: Performed by: PLASTIC SURGERY

## 2024-08-01 PROCEDURE — 7100000000 HC PACU RECOVERY - FIRST 15 MIN: Performed by: PLASTIC SURGERY

## 2024-08-01 DEVICE — SALINE BREAST IMPLANT WITH DIAPHRAGM VALVE, 325CC  SMOOTH ROUND SALINE
Type: IMPLANTABLE DEVICE | Site: BREAST | Status: FUNCTIONAL
Brand: MENTOR SMOOTH ROUND MODERATE PLUS PROFILE

## 2024-08-01 RX ORDER — SODIUM CHLORIDE 9 MG/ML
INJECTION, SOLUTION INTRAVENOUS PRN
Status: DISCONTINUED | OUTPATIENT
Start: 2024-08-01 | End: 2024-08-01 | Stop reason: HOSPADM

## 2024-08-01 RX ORDER — BUPIVACAINE HYDROCHLORIDE AND EPINEPHRINE 2.5; 5 MG/ML; UG/ML
INJECTION, SOLUTION EPIDURAL; INFILTRATION; INTRACAUDAL; PERINEURAL PRN
Status: DISCONTINUED | OUTPATIENT
Start: 2024-08-01 | End: 2024-08-01 | Stop reason: ALTCHOICE

## 2024-08-01 RX ORDER — ONDANSETRON 2 MG/ML
INJECTION INTRAMUSCULAR; INTRAVENOUS PRN
Status: DISCONTINUED | OUTPATIENT
Start: 2024-08-01 | End: 2024-08-01 | Stop reason: SDUPTHER

## 2024-08-01 RX ORDER — NALOXONE HYDROCHLORIDE 0.4 MG/ML
INJECTION, SOLUTION INTRAMUSCULAR; INTRAVENOUS; SUBCUTANEOUS PRN
Status: DISCONTINUED | OUTPATIENT
Start: 2024-08-01 | End: 2024-08-01 | Stop reason: HOSPADM

## 2024-08-01 RX ORDER — PROPOFOL 10 MG/ML
INJECTION, EMULSION INTRAVENOUS PRN
Status: DISCONTINUED | OUTPATIENT
Start: 2024-08-01 | End: 2024-08-01 | Stop reason: SDUPTHER

## 2024-08-01 RX ORDER — CEPHALEXIN 500 MG/1
500 CAPSULE ORAL 4 TIMES DAILY
Qty: 20 CAPSULE | Refills: 0 | Status: SHIPPED | OUTPATIENT
Start: 2024-08-01 | End: 2024-08-06

## 2024-08-01 RX ORDER — HYDROCODONE BITARTRATE AND ACETAMINOPHEN 5; 325 MG/1; MG/1
1 TABLET ORAL
Status: COMPLETED | OUTPATIENT
Start: 2024-08-01 | End: 2024-08-01

## 2024-08-01 RX ORDER — SODIUM CHLORIDE 9 MG/ML
INJECTION, SOLUTION INTRAVENOUS CONTINUOUS
Status: DISCONTINUED | OUTPATIENT
Start: 2024-08-01 | End: 2024-08-01 | Stop reason: HOSPADM

## 2024-08-01 RX ORDER — SODIUM CHLORIDE 0.9 % (FLUSH) 0.9 %
5-40 SYRINGE (ML) INJECTION EVERY 12 HOURS SCHEDULED
Status: DISCONTINUED | OUTPATIENT
Start: 2024-08-01 | End: 2024-08-01 | Stop reason: HOSPADM

## 2024-08-01 RX ORDER — FENTANYL CITRATE 50 UG/ML
INJECTION, SOLUTION INTRAMUSCULAR; INTRAVENOUS PRN
Status: DISCONTINUED | OUTPATIENT
Start: 2024-08-01 | End: 2024-08-01 | Stop reason: SDUPTHER

## 2024-08-01 RX ORDER — HYDROCODONE BITARTRATE AND ACETAMINOPHEN 5; 325 MG/1; MG/1
1 TABLET ORAL EVERY 4 HOURS PRN
Qty: 15 TABLET | Refills: 0 | Status: SHIPPED | OUTPATIENT
Start: 2024-08-01 | End: 2024-08-08

## 2024-08-01 RX ORDER — SODIUM CHLORIDE 0.9 % (FLUSH) 0.9 %
5-40 SYRINGE (ML) INJECTION PRN
Status: DISCONTINUED | OUTPATIENT
Start: 2024-08-01 | End: 2024-08-01 | Stop reason: HOSPADM

## 2024-08-01 RX ORDER — ONDANSETRON 4 MG/1
4 TABLET, FILM COATED ORAL DAILY PRN
Qty: 12 TABLET | Refills: 1 | Status: SHIPPED | OUTPATIENT
Start: 2024-08-01

## 2024-08-01 RX ORDER — HYDROMORPHONE HYDROCHLORIDE 1 MG/ML
0.5 INJECTION, SOLUTION INTRAMUSCULAR; INTRAVENOUS; SUBCUTANEOUS EVERY 5 MIN PRN
Status: DISCONTINUED | OUTPATIENT
Start: 2024-08-01 | End: 2024-08-01 | Stop reason: HOSPADM

## 2024-08-01 RX ORDER — LIDOCAINE HYDROCHLORIDE 20 MG/ML
INJECTION, SOLUTION INTRAVENOUS PRN
Status: DISCONTINUED | OUTPATIENT
Start: 2024-08-01 | End: 2024-08-01 | Stop reason: SDUPTHER

## 2024-08-01 RX ORDER — CEFAZOLIN SODIUM 1 G/3ML
INJECTION, POWDER, FOR SOLUTION INTRAMUSCULAR; INTRAVENOUS PRN
Status: DISCONTINUED | OUTPATIENT
Start: 2024-08-01 | End: 2024-08-01 | Stop reason: ALTCHOICE

## 2024-08-01 RX ORDER — MEPERIDINE HYDROCHLORIDE 25 MG/ML
12.5 INJECTION INTRAMUSCULAR; INTRAVENOUS; SUBCUTANEOUS EVERY 5 MIN PRN
Status: DISCONTINUED | OUTPATIENT
Start: 2024-08-01 | End: 2024-08-01 | Stop reason: HOSPADM

## 2024-08-01 RX ORDER — DEXAMETHASONE SODIUM PHOSPHATE 10 MG/ML
INJECTION INTRAMUSCULAR; INTRAVENOUS PRN
Status: DISCONTINUED | OUTPATIENT
Start: 2024-08-01 | End: 2024-08-01 | Stop reason: SDUPTHER

## 2024-08-01 RX ORDER — GENTAMICIN 40 MG/ML
INJECTION, SOLUTION INTRAMUSCULAR; INTRAVENOUS PRN
Status: DISCONTINUED | OUTPATIENT
Start: 2024-08-01 | End: 2024-08-01 | Stop reason: ALTCHOICE

## 2024-08-01 RX ADMIN — DEXAMETHASONE SODIUM PHOSPHATE 10 MG: 10 INJECTION INTRAMUSCULAR; INTRAVENOUS at 08:38

## 2024-08-01 RX ADMIN — LIDOCAINE HYDROCHLORIDE 50 MG: 20 INJECTION, SOLUTION INTRAVENOUS at 08:33

## 2024-08-01 RX ADMIN — HYDROMORPHONE HYDROCHLORIDE 0.5 MG: 1 INJECTION, SOLUTION INTRAMUSCULAR; INTRAVENOUS; SUBCUTANEOUS at 10:01

## 2024-08-01 RX ADMIN — FENTANYL CITRATE 50 MCG: 50 INJECTION, SOLUTION INTRAMUSCULAR; INTRAVENOUS at 08:44

## 2024-08-01 RX ADMIN — FENTANYL CITRATE 25 MCG: 50 INJECTION, SOLUTION INTRAMUSCULAR; INTRAVENOUS at 08:54

## 2024-08-01 RX ADMIN — FENTANYL CITRATE 50 MCG: 50 INJECTION, SOLUTION INTRAMUSCULAR; INTRAVENOUS at 08:33

## 2024-08-01 RX ADMIN — ONDANSETRON HYDROCHLORIDE 4 MG: 2 SOLUTION INTRAMUSCULAR; INTRAVENOUS at 09:18

## 2024-08-01 RX ADMIN — SODIUM CHLORIDE: 9 INJECTION, SOLUTION INTRAVENOUS at 08:24

## 2024-08-01 RX ADMIN — CEFAZOLIN 2000 MG: 2 INJECTION, POWDER, FOR SOLUTION INTRAMUSCULAR; INTRAVENOUS at 08:39

## 2024-08-01 RX ADMIN — HYDROCODONE BITARTRATE AND ACETAMINOPHEN 1 TABLET: 5; 325 TABLET ORAL at 10:34

## 2024-08-01 RX ADMIN — FENTANYL CITRATE 25 MCG: 50 INJECTION, SOLUTION INTRAMUSCULAR; INTRAVENOUS at 09:04

## 2024-08-01 RX ADMIN — FENTANYL CITRATE 25 MCG: 50 INJECTION, SOLUTION INTRAMUSCULAR; INTRAVENOUS at 09:18

## 2024-08-01 RX ADMIN — PROPOFOL 100 MG: 10 INJECTION, EMULSION INTRAVENOUS at 08:33

## 2024-08-01 RX ADMIN — PROPOFOL 120 MCG/KG/MIN: 10 INJECTION, EMULSION INTRAVENOUS at 08:38

## 2024-08-01 RX ADMIN — FENTANYL CITRATE 25 MCG: 50 INJECTION, SOLUTION INTRAMUSCULAR; INTRAVENOUS at 09:25

## 2024-08-01 ASSESSMENT — PAIN DESCRIPTION - DESCRIPTORS
DESCRIPTORS: DISCOMFORT;ACHING
DESCRIPTORS: ACHING;DISCOMFORT
DESCRIPTORS: DISCOMFORT;ACHING

## 2024-08-01 ASSESSMENT — PAIN DESCRIPTION - LOCATION
LOCATION: BREAST

## 2024-08-01 ASSESSMENT — PAIN - FUNCTIONAL ASSESSMENT
PAIN_FUNCTIONAL_ASSESSMENT: NONE - DENIES PAIN
PAIN_FUNCTIONAL_ASSESSMENT: ADULT NONVERBAL PAIN SCALE (NPVS)

## 2024-08-01 ASSESSMENT — PAIN SCALES - GENERAL
PAINLEVEL_OUTOF10: 4
PAINLEVEL_OUTOF10: 5
PAINLEVEL_OUTOF10: 5

## 2024-08-01 ASSESSMENT — PAIN DESCRIPTION - ORIENTATION
ORIENTATION: RIGHT;LEFT

## 2024-08-01 ASSESSMENT — PAIN DESCRIPTION - PAIN TYPE
TYPE: SURGICAL PAIN
TYPE: SURGICAL PAIN

## 2024-08-01 NOTE — DISCHARGE INSTRUCTIONS
Discharge Home.   Call office to schedule a follow-up appointment at [phone]  Please call to schedule an appointment to be seen In our office on Tuesday 8-6-24  Diet: regular diet  Activity: ambulate in house and no Strenuous exercise for 1 week    Shower/Bathing:  You can shower in 48 hours over the incision site.  At that time you can allow soap and water to rinse off the incision site while showering.  Once you are done in the shower you can pat dry the incision site with a clean paper towel.  No baths, hot tubs or soaking of the wound site at this time.     Dressings /Splint /Wound Care:Keep wound clean and dry.  There is no need to remove your steri strips.  Your surgical bra and  needs to be worn at all times.  Your bra may become saturated with drainage this is to be expected.  The bra can be off for period of time to have it washed and dried.  You can use gauze padding as needed for comfort under the surgical bra.  This gauze dressing can be changed as needed if the gauze becomes saturated.  At any point during the day if you notice any sudden changes to the appearance of the wound or the site operated on you will need to contact our office.  This may include opening of wound, pus, changes in size, color etc.      Do not use ice over your breasts.     Educated to contact physician at [phone] with concerns or signs of infection (redness, increasing pain, discharge, odor, fever).

## 2024-08-01 NOTE — OP NOTE
Operative Note      Patient: Luly Alatorre  YOB: 1945  MRN: [id number]    Date of Procedure: 8/1/2024    Pre-Op Diagnosis Codes:     * History of bilateral breast implants [Z98.82] following mastectomies    Post-Op Diagnosis: Same       Procedure(s):  Removal of Bilateral Saline Breast Implants, Placement of Bilateral Saline Breast Implants, bilateral revision of breast capsule    Surgeon(s):  Trell Segal MD    Assistant:   Physician Assistant: Leandro Duenas PA    Anesthesia: General    Estimated Blood Loss (mL): Minimal    Complications: None    Specimens:   ID Type Source Tests Collected by Time Destination   A : RIGHT BREAST SCAR Tissue Breast SURGICAL PATHOLOGY Trell Segal MD 8/1/2024 0920    B : LEFT BREAST SCAR Tissue Breast SURGICAL PATHOLOGY Trell Segal MD 8/1/2024 0921        Implants:  Implant Name Type Inv. Item Serial No.  Lot No. LRB No. Used Action   IMPLANT BRST 390CC DIA11.9-11.5CM P4.4-5.2CM RITA NACL RND - [id number]  IMPLANT BRST 390CC DIA11.9-11.5CM P4.4-5.2CM RITA NACL RND [id number] [employer]-WD [id number] Left 1 Implanted   IMPLANT BRST 390CC DIA11.9-11.5CM P4.4-5.2CM RITA NACL RND - [id number]  IMPLANT BRST 390CC DIA11.9-11.5CM P4.4-5.2CM RITA NACL RND [id number] [employer]ENT[employer]AV[employer]WD [id number] Right 1 Implanted         Drains: * No LDAs found *    Findings:  Infection Present At Time Of Surgery (PATOS) (choose all levels that have infection present):  No infection present  Other Findings: Right sided deflated saline breast implant, PIP Anna markings at 3 6 5 cc volume.  Investigation of the implant yielded small pinpoint leak at the lateral circumference.  Intact PIP Anna 3 6 5 cc volume on the left.    Detailed Description of Procedure:         ASSISTANT:  Leandro Duenas PA-C.  PA was required for the case due  to lack of adequately trained assistant; was involved in prepping,  draping, retracting, dressing and suturing.          cephalic and medial advancement of the implant.  On the contralateral side, there was significant laxity at the lateral and inframammary aspect compared to the contralateral side.  Using the bipolar set to 40 by the popcorn technique was used to tighten the lateral inferior aspect to migrate the implant in the cephalic direction.  Patient's morphology was more optimal.    Oxly saline implants were chosen on the left sides of Oxly smooth round moderate plus profile saline reference #[id number] serial #[id number], 325 cc of volume filled to 390 cc intraoperatively    On the right side, is a Oxly smooth round moderate plus profile saline implant reference #[id number], serial #[id number], 325 cc of volume overfilled to 390 cc per  guidelines.    The implants were opened, marked for side, soaked in antibiotic containing saline.  They were expunged of all air, 100 cc of injectable saline were placed in this to expunged the rest of the air.  Each were placed into its respective pocket and filled to the prescribed 390 cc.  This was done after sterile conditions and changing gloves.    Closure was performed with a 2-0 PDS deep followed by 3-0 Monocryl deep dermal and 3-0 Monocryl strata fix running subcuticular suture.  Dermabond is applied followed by 1 and Steri-Strips, Kerlix fluffs, surgical bra.    She emerged from anesthesia without complication and taken to PACU in good condition.      Electronically signed by Trell Segal MD on 8/1/2024 at 9:45 AM

## 2024-08-01 NOTE — ANESTHESIA PRE PROCEDURE
Counseling given: Not Answered      Vital Signs (Current):   Vitals:    07/26/24 1614 08/01/24 0625   BP:  (!) 145/67   Pulse:  78   Resp:  18   Temp:  97.7 °F (36.5 °C)   TempSrc:  Temporal   SpO2:  99%   Weight: 49 kg (108 lb) 49 kg (108 lb)   Height: 1.6 m (5' 3\") 1.6 m (5' 3\")                                              BP Readings from Last 3 Encounters:   08/01/24 (!) 145/67   05/08/24 (!) 161/85   10/09/23 (!) 149/89       NPO Status: Time of last liquid consumption: 2300                        Time of last solid consumption: 2300                        Date of last liquid consumption: 07/31/24                        Date of last solid food consumption: 07/31/24    BMI:   Wt Readings from Last 3 Encounters:   08/01/24 49 kg (108 lb)   06/07/24 48.1 kg (106 lb)   05/08/24 48.6 kg (107 lb 3.2 oz)     Body mass index is 19.13 kg/m².    CBC:   Lab Results   Component Value Date/Time    WBC 7.1 05/22/2018 12:22 PM    RBC 4.34 05/22/2018 12:22 PM    HGB 13.1 05/22/2018 12:22 PM    HCT 39.7 05/22/2018 12:22 PM    MCV 91.4 05/22/2018 12:22 PM    RDW 13.2 05/22/2018 12:22 PM     05/22/2018 12:22 PM       CMP:   Lab Results   Component Value Date/Time     05/22/2018 12:22 PM    K 4.5 05/22/2018 12:22 PM     05/22/2018 12:22 PM    CO2 31 05/22/2018 12:22 PM    BUN 25 05/22/2018 12:22 PM    CREATININE 0.69 05/22/2018 12:22 PM    GLUCOSE 87 05/22/2018 12:22 PM    CALCIUM 9.8 05/22/2018 12:22 PM       POC Tests: No results for input(s): \"POCGLU\", \"POCNA\", \"POCK\", \"POCCL\", \"POCBUN\", \"POCHEMO\", \"POCHCT\" in the last 72 hours.    Coags: No results found for: \"PROTIME\", \"INR\", \"APTT\"    HCG (If Applicable): No results found for: \"PREGTESTUR\", \"PREGSERUM\", \"HCG\", \"HCGQUANT\"     ABGs: No results found for: \"PHART\", \"PO2ART\", \"QJI1ZDM\", \"VAJ0XMH\", \"BEART\", \"P9SJAOEF\"     Type & Screen (If Applicable):  No results found for: \"LABABO\"    Drug/Infectious Status (If Applicable):  No results 
found for: \"HIV\", \"HEPCAB\"    COVID-19 Screening (If Applicable): No results found for: \"COVID19\"        Anesthesia Evaluation  Patient summary reviewed   history of anesthetic complications: PONV and history of difficult intubation.  Airway: Mallampati: II  TM distance: >3 FB   Neck ROM: full  Mouth opening: < 3 FB   Dental: normal exam         Pulmonary:   (+) pneumonia: resolved,                                      Cardiovascular:    (+) valvular problems/murmurs:               ROS comment: Pt states she saw cardiologist prior to procedure for abnormal EKG, had stress test, pt states \"everything checked out normal\"     Neuro/Psych:   (+) neuromuscular disease (fibromyalgia):             ROS comment: Motion sickness GI/Hepatic/Renal:             Endo/Other:    (+) hypothyroidism: arthritis:..                  ROS comment: Osteoporosis  [place]-has hearing aids    initial presentation of ruptured right breast implant, saline.  Abdominal:             Vascular:          Other Findings:         Anesthesia Plan      general     ASA 3       Induction: intravenous.      Anesthetic plan and risks discussed with patient (grandson at bedside).    Use of blood products discussed with patient whom consented to blood products.    Plan discussed with attending.                  Viky Daley   8/1/2024

## 2024-08-01 NOTE — ANESTHESIA POSTPROCEDURE EVALUATION
Department of Anesthesiology  Postprocedure Note    Patient: Luly Alatorre  MRN: [id number]  YOB: 1945  Date of evaluation: 8/1/2024    Procedure Summary       Date: 08/01/24 Room / Location: [address]    Anesthesia Start: 0821 Anesthesia Stop: 0940    Procedure: Removal of Bilateral Saline Breast Implants, Placement of Bilateral Saline Breast Implants (Bilateral: Breast) Diagnosis:       History of bilateral breast implants      (History of bilateral breast implants [Z98.82])    Surgeons: Trell Segal MD Responsible Provider: Ranjit Brown MD    Anesthesia Type: general ASA Status: 3            Anesthesia Type: No value filed.    Babs Phase I: Babs Score: 8    Babs Phase II:      Anesthesia Post Evaluation    Patient location during evaluation: PACU  Patient participation: complete - patient participated  Level of consciousness: awake  Pain score: 3  Airway patency: patent  Nausea & Vomiting: no nausea and no vomiting  Cardiovascular status: blood pressure returned to baseline  Respiratory status: acceptable  Hydration status: euvolemic    No notable events documented.

## 2024-08-06 ENCOUNTER — OFFICE VISIT (OUTPATIENT)
Dept: SURGERY | Age: 79
End: 2024-08-06

## 2024-08-06 VITALS — TEMPERATURE: 97.2 F

## 2024-08-06 DIAGNOSIS — Z98.82 S/P SALINE BREAST IMPLANT: ICD-10-CM

## 2024-08-06 DIAGNOSIS — Z98.82 HISTORY OF BILATERAL BREAST IMPLANTS: Primary | ICD-10-CM

## 2024-08-06 PROCEDURE — 99024 POSTOP FOLLOW-UP VISIT: CPT | Performed by: PHYSICIAN ASSISTANT

## 2024-08-06 NOTE — PROGRESS NOTES
Subjective:    Follow up today from Removal of Bilateral Saline Breast Implants, Placement of Bilateral Saline Breast Implants - Bilateral 8/1/2024 . Denies fever, nausea, vomiting, leg pain or swelling, pain is mild-absent.  The pt states she is  taking her antibiotic and continues to wear her surgical bra.    She is ambulating at home.  She is  wearing her surgical bra at all times.    Objective:    Temp 97.2 °F (36.2 °C) (Infrared)       Left Breast- Clean, dry, and intact, no drainage. Steri strips intact, no signs of infection.      Right Breast Clean, dry, and intact, no drainage. Steri strips intact, no signs of infection.         Assessment:    Patient Active Problem List   Diagnosis    History of bilateral breast implants    Post-op pain       Plan:     Status Post : Removal of Bilateral Saline Breast Implants, Placement of Bilateral Saline Breast Implants - Bilateral 8/1/2024      -Continue wearing surgical bra at all times  -Educated the pt that her pain is proportionate to the amount of surgery she had and she may begin using Ibuprofen in addition to pain medication.    -ABX to completion  -Pain control PRN  -No driving while taking narcotic pain medication or while impaired second to limited UE  ROM  -No heavy lifting at this time  -OK to shower at this time.  Advised the patient that they can allow soap and water to rinse of the incision site while showering.  Once they are done in the shower they are to pat dry the incision site with a clean paper towel.  No baths, hot tubs or soaking of the wound site at this time.  Pt voices understanding.       F/U in 2 weeks.    Call office with concerns or signs of infection.      JAMES Juárez   10:43 AM  8/6/2024

## 2024-08-08 LAB — SURGICAL PATHOLOGY REPORT: NORMAL

## 2024-08-19 ENCOUNTER — OFFICE VISIT (OUTPATIENT)
Dept: SURGERY | Age: 79
End: 2024-08-19

## 2024-08-19 VITALS — TEMPERATURE: 96.9 F

## 2024-08-19 DIAGNOSIS — Z98.82 S/P SALINE BREAST IMPLANT: ICD-10-CM

## 2024-08-19 DIAGNOSIS — Z98.82 HISTORY OF BILATERAL BREAST IMPLANTS: Primary | ICD-10-CM

## 2024-08-19 PROCEDURE — 99024 POSTOP FOLLOW-UP VISIT: CPT | Performed by: PHYSICIAN ASSISTANT

## 2024-08-19 NOTE — PROGRESS NOTES
Subjective:    Follow up today from Removal of Bilateral Saline Breast Implants, Placement of Bilateral Saline Breast Implants - Bilateral 8/1/2024 . Denies fever, nausea, vomiting, leg pain or swelling, pain is absent.  The pt states she is  taking her antibiotic and continues to wear her surgical bra.    She is ambulating at home.  She is  wearing her surgical bra at all times.      Objective:    Temp 96.9 °F (36.1 °C) (Infrared)       Left Breast- Clean, dry, and intact, no drainage. Steri strips intact, no signs of infection.      Right Breast Clean, dry, and intact, no drainage. Steri strips intact, no signs of infection.         Assessment:    Patient Active Problem List   Diagnosis    History of bilateral breast implants    Post-op pain     Path Number: [id number]     -- Diagnosis --   A.  Right breast scar: Cicatrix   B.  Left breast scar: Cicatrix       Plan:     Status Post : Removal of Bilateral Saline Breast Implants, Placement of Bilateral Saline Breast Implants - Bilateral 8/1/2024      -Continue wearing surgical bra at all times  -Educated the pt that her pain is proportionate to the amount of surgery she had and she may begin using Ibuprofen in addition to pain medication.      -Pain control PRN  -No driving while taking narcotic pain medication or while impaired second to limited UE  ROM  -No heavy lifting at this time  -OK to shower at this time.  Advised the patient that they can allow soap and water to rinse of the incision site while showering.  Once they are done in the shower they are to pat dry the incision site with a clean paper towel.  No baths, hot tubs or soaking of the wound site at this time.  Pt voices understanding.     Scar Care Instructions      Sunscreen Recommendations for Scars  We recommend that all patients use a sunscreen when outside but especially on new scars (that are exposed to sunlight) for a year after their procedure.   The SPF should be at least 35. Follow the

## 2024-08-26 ENCOUNTER — OFFICE VISIT (OUTPATIENT)
Dept: SURGERY | Age: 79
End: 2024-08-26

## 2024-08-26 VITALS — TEMPERATURE: 97.6 F

## 2024-08-26 DIAGNOSIS — Z98.82 HISTORY OF BILATERAL BREAST IMPLANTS: Primary | ICD-10-CM

## 2024-08-26 PROCEDURE — 99024 POSTOP FOLLOW-UP VISIT: CPT | Performed by: PHYSICIAN ASSISTANT

## 2024-08-26 NOTE — PROGRESS NOTES
Subjective:    Follow up today from Removal of Bilateral Saline Breast Implants, Placement of Bilateral Saline Breast Implants - Bilateral 8/1/2024 . Denies fever, nausea, vomiting, leg pain or swelling, pain is absent.  The pt states she is  taking her antibiotic and continues to wear her surgical bra.    She is ambulating at home.  She is  wearing her surgical bra at all times.    She presents her office today with concerns of \"bumpy\" incisions to her bilateral breast inframammary folds    Objective:    Temp 97.6 °F (36.4 °C) (Infrared)       Left Breast- Clean, dry, and intact, no drainage.  no signs of infection.      Right Breast Clean, dry, and intact, no drainage.  no signs of infection.         Assessment:    Patient Active Problem List   Diagnosis    History of bilateral breast implants    Post-op pain     Path Number: [id number]     -- Diagnosis --   A.  Right breast scar: Cicatrix   B.  Left breast scar: Cicatrix       Plan:     Status Post : Removal of Bilateral Saline Breast Implants, Placement of Bilateral Saline Breast Implants - Bilateral 8/1/2024      -Continue wearing surgical bra at all times  -Educated the pt that her pain is proportionate to the amount of surgery she had and she may begin using Ibuprofen in addition to pain medication.      -Pain control PRN  -No driving while taking narcotic pain medication or while impaired second to limited UE  ROM  -No heavy lifting at this time  -OK to shower at this time.  Advised the patient that they can allow soap and water to rinse of the incision site while showering.  Once they are done in the shower they are to pat dry the incision site with a clean paper towel.  No baths, hot tubs or soaking of the wound site at this time.  Pt voices understanding.     Scar Care Instructions      Sunscreen Recommendations for Scars  We recommend that all patients use a sunscreen when outside but especially on new scars (that are exposed to sunlight) for a year  after their procedure.   The SPF should be at least 35. Follow the application directions on the bottle.   Why is it so Important to Use Sunscreen on Scars?  A scar is new and more fragile than the surrounding skin.   If you do not use sunscreen, the scar line will react differently to the sun than the surrounding skin.   If you don't use sunscreen, the scar tissue will become darker than surrounding skin. This is a hyper-pigmented scar and will remain darker than the other skin.   After one year, the scar and surrounding skin should react equally to sun.   Superficial Scar Massage  Scar massage desensitizes and reduces scar adhesions so skin glides freely.   Rub in a circular motion on and around the scar with firm, even pressure for 5 minutes four times per day   You can start scar massage once incision is completely healed and strong enough to handle the motion (usually 10 - 14 days post operatively).   You use lotion to do the scar massage to allow ease with motion over the scar and prevent friction at the area.   Silicone Sheeting  Silicone sheeting for scar maturity was discussed with the patient today.    Patient had no further Questions. Paper instructions given to patient.    I reiterated to the patient today that her incisions are clean dry and intact and that the concern for \"bumpy\" incisions is to be expected as the incisions are closed under minimal tension with skin eversion.  Informed patient this will relax over time.    F/U in 3 months    Call office with concerns or signs of infection.      JAMES Juárez   1:19 PM  8/26/2024

## 2024-09-12 ENCOUNTER — OFFICE VISIT (OUTPATIENT)
Dept: SURGERY | Age: 79
End: 2024-09-12

## 2024-09-12 VITALS — OXYGEN SATURATION: 96 % | HEART RATE: 72 BPM | TEMPERATURE: 97.2 F

## 2024-09-12 DIAGNOSIS — Z98.82 HISTORY OF BILATERAL BREAST IMPLANTS: Primary | ICD-10-CM

## 2024-09-12 DIAGNOSIS — Z98.82 S/P SALINE BREAST IMPLANT: ICD-10-CM

## 2024-09-12 PROCEDURE — 99024 POSTOP FOLLOW-UP VISIT: CPT | Performed by: PLASTIC SURGERY

## 2024-09-16 ENCOUNTER — TELEPHONE (OUTPATIENT)
Dept: SURGERY | Age: 79
End: 2024-09-16

## 2024-09-16 NOTE — TELEPHONE ENCOUNTER
Need medical clearance pcp    Outpatient revision of right breast reconstruction with elevation of inframamory fold,possible right breat implant exchange   Surgery has been scheduled at [address] on 10/17/2024, Pre-Admission Testing will call you prior to surgery to inform you arrival time and any other additional directions,if they are unable to reach you,please call them two days prior at [phone].  If taking Fish Oil, Vitamins, two weeks prior to surgery stop taking. If taking NSAIDS (such as Aspirin, Ibuprofen) anticoagulants please consult with your prescribing physician to get further instructions on when to stop medication prior to surgery that is scheduled, patient understood.

## 2024-10-03 ENCOUNTER — PREP FOR PROCEDURE (OUTPATIENT)
Dept: SURGERY | Age: 79
End: 2024-10-03

## 2024-10-03 DIAGNOSIS — Z98.890 HX OF BREAST RECONSTRUCTION: ICD-10-CM

## 2024-10-10 NOTE — H&P
Department of Plastic Surgery - Adult  Attending Consult Note      CHIEF COMPLAINT:   breast     History Obtained From:  patient    HISTORY OF PRESENT ILLNESS:                The patient is a 79 y.o. female who presents for Follow up today from Removal of Bilateral Saline Breast Implants, Placement of Bilateral Saline Breast Implants - Bilateral 8/1/2024 .  She is now 6 weeks postop.  She presents today saying that her breast reconstruction has some asymmetry.  Patient states that the right breast has bottomed out compared to the contralateral side.  Patient still has some tenderness and fullness at the upper outer quadrant on the left breast as well.    Past Medical History:    Past Medical History:   Diagnosis Date    Arthritis     Fibromyalgia     Hearing aid worn     Hearing difficulty of both ears     Hypothyroid     Motion sickness     Osteoporosis     Pneumonia     PONV (postoperative nausea and vomiting)     Primary osteoarthritis of both first carpometacarpal joints     SCHEDULED FOR THE  RIGHT THUMB SURGERY ON 05/29/2018 AT [facility]      Past Surgical History:    Past Surgical History:   Procedure Laterality Date    BACK SURGERY  07/2018    L4 L5 fusion    BREAST BIOPSY      '67,'69,'75,'90    BREAST ENHANCEMENT SURGERY Bilateral     '91 silicone implants. 2000 saline implants    BREAST ENHANCEMENT SURGERY Bilateral 8/1/2024    Removal of Bilateral Saline Breast Implants, Placement of Bilateral Saline Breast Implants performed by Trell Segal MD at [facility] OR    BREAST SURGERY Bilateral     breast tissue removed and saline implants placed '91    CHOLECYSTECTOMY      COLONOSCOPY      ENDOSCOPY, COLON, DIAGNOSTIC      EYE SURGERY Bilateral     CATARACT SURGERY    HAND SURGERY Right     HAND SURGERY Left 12/06/2019    [facility] arthroplasty    HAND SURGERY Left 09/22/2020    L thumb carpometacarpal arthroplasty    OVARY REMOVAL      SINUS SURGERY      SKIN BIOPSY      benign     Current    Transportation Needs: Not on file   Physical Activity: Not on file   Stress: Not on file   Social Connections: Not on file   Intimate Partner Violence: Not At Risk (8/18/2023)    Received from [facility] Safety     Threatened: Not on file     Insulted: Not on file     Physically Hurt : Not on file     Scream: Not on file   Housing Stability: At Risk (8/18/2023)    Received from [facility] Housing     Living Situation: Not on file     Housing Problems: Not on file     Family History:   Family History   Problem Relation Age of Onset    No Known Problems Mother     No Known Problems Father     Breast Cancer Daughter 55       REVIEW OF SYSTEMS:    CONSTITUTIONAL:  negative for  fevers, chills, sweats and fatigue  EYES: negative for dipolpia or acute vision loss.   RESPIRATORY:  negative for  dry cough, cough with sputum, dyspnea, wheezing and chest pain  HENT:negative for pain, headache, difficulty swallowing or nose bleeds.  CARDIOVASCULAR:  negative for  chest pain, dyspnea, palpitations, syncope  GASTROINTESTINAL:  negative for nausea, vomiting, change in bowel habits, diarrhea, and constipation   EXTREMITIES: negative for edema  MUSCULOSKELETAL: negative for muscle weakness  SKIN: negative for itching or rashes.  HEME: negative for easy brusing or bleeding  BEHAVIOR/PSYCH:  negative for poor appetite, increased appetite, decreased sleep and poor concentration       Pulse 72   Temp 97.2 °F (36.2 °C) (Infrared)   SpO2 96%         Left Breast- Clean, dry, and intact, no drainage.  no signs of infection.  Some tenderness to palpation upper outer quadrant.  Patient has more of her own soft tissue fullness in this area compared to the contralateral side.  No masses to palpation     Right Breast Clean, dry, and intact, no drainage.  no signs of infection.   Inframammary fold descended about 1 and half centimeters lower than the contralateral side exhibiting some asymmetry.

## 2024-10-11 NOTE — PROGRESS NOTES
[facility]   PRE-ADMISSION TESTING GENERAL INSTRUCTIONS  PAT Phone Number: [phone]      GENERAL INSTRUCTIONS:    [x] Antibacterial Soap Shower Night before AND the Morning of procedure.  [x] Do not wear makeup, lotions, powders, deodorant the morning of surgery.  [x] No solid food after midnight. You may have SIPS of clear liquids up until 2 hours before your arrival time to the hospital.   [x] You may brush your teeth, gargle, but do not swallow water.   [x] No tobacco products, illegal drugs, or alcohol within 24 hours of your surgery.  [x] Jewelry or valuables should not be brought to the hospital. All body and/or tongue piercing's must be removed prior to arriving to hospital. No contact lens or hair pins.   [x] Arrange transportation with a responsible adult  to and from the hospital. Arrange for someone to be with you for the remainder of the day and for 24 hours after your procedure due to having had anesthesia.          -Who will be your  for transportation? Grandson         -Who will be staying with you for 24 hrs after your procedure? Grandson   [x] Bring insurance card and photo ID.  [] Bring copy of living will or healthcare power of  papers to be placed in your electronic record.  [] Urine Pregnancy test will be preformed the day of surgery. A specimen sample may be brought from home.  [] Transfusion (Green) Bracelet: Please bring with you to hospital, day of surgery.     PARKING INSTRUCTIONS:     [x] ARRIVAL DATE & TIME:  10/17  @  8990   [x] Times are subject to change. We will contact you the business day before surgery if that were to occur.  [x] Enter into the [facility] Entrance. Two people may accompany you. Masks are not required.  [x] Parking Lot \"I\" is where you will park. It is located on the corner of [address] and [address]. The entrance is on [address].   Only one vehicle - per patient, is permitted in parking lot.

## 2024-10-16 ENCOUNTER — ANESTHESIA EVENT (OUTPATIENT)
Dept: OPERATING ROOM | Age: 79
End: 2024-10-16
Payer: MEDICARE

## 2024-10-16 NOTE — PROGRESS NOTES
Left a voicemail message for patient on number [phone] to notify her of time change for surgery scheduled on 10/17.  Scheduled at 9:35 am.  Arrival time 7 am.  I also called the patient's emergency contact, Olivia Suarez (patient's daughter) to notify her that the patient needed to arrive to the hospital at 10/17 at 7 am.  Olivia verbalized understanding and stated that she would make sure her mom was aware of new arrival time.

## 2024-10-16 NOTE — PROGRESS NOTES
Patient called back and verbalized understanding of arrival time of 0700 for surgery 10/17.   Angie Ceja RN

## 2024-10-17 ENCOUNTER — ANESTHESIA (OUTPATIENT)
Dept: OPERATING ROOM | Age: 79
End: 2024-10-17
Payer: MEDICARE

## 2024-10-17 ENCOUNTER — HOSPITAL ENCOUNTER (OUTPATIENT)
Age: 79
Setting detail: OUTPATIENT SURGERY
Discharge: HOME | End: 2024-10-17
Attending: PLASTIC SURGERY | Admitting: PLASTIC SURGERY
Payer: MEDICARE

## 2024-10-17 VITALS
TEMPERATURE: 98.1 F | HEIGHT: 63 IN | SYSTOLIC BLOOD PRESSURE: 131 MMHG | BODY MASS INDEX: 19.14 KG/M2 | OXYGEN SATURATION: 99 % | DIASTOLIC BLOOD PRESSURE: 79 MMHG | HEART RATE: 79 BPM | RESPIRATION RATE: 16 BRPM | WEIGHT: 108 LBS

## 2024-10-17 DIAGNOSIS — G89.18 POST-OP PAIN: Primary | ICD-10-CM

## 2024-10-17 DIAGNOSIS — Z98.890 HX OF BREAST RECONSTRUCTION: ICD-10-CM

## 2024-10-17 PROCEDURE — 7100000000 HC PACU RECOVERY - FIRST 15 MIN: Performed by: PLASTIC SURGERY

## 2024-10-17 PROCEDURE — 6360000002 HC RX W HCPCS: Performed by: PLASTIC SURGERY

## 2024-10-17 PROCEDURE — 7100000001 HC PACU RECOVERY - ADDTL 15 MIN: Performed by: PLASTIC SURGERY

## 2024-10-17 PROCEDURE — 3600000006 HC SURGERY LEVEL 6 BASE: Performed by: PLASTIC SURGERY

## 2024-10-17 PROCEDURE — C1789 PROSTHESIS, BREAST, IMP: HCPCS | Performed by: PLASTIC SURGERY

## 2024-10-17 PROCEDURE — 2500000003 HC RX 250 WO HCPCS

## 2024-10-17 PROCEDURE — 2709999900 HC NON-CHARGEABLE SUPPLY: Performed by: PLASTIC SURGERY

## 2024-10-17 PROCEDURE — 88305 TISSUE EXAM BY PATHOLOGIST: CPT

## 2024-10-17 PROCEDURE — A4217 STERILE WATER/SALINE, 500 ML: HCPCS | Performed by: PLASTIC SURGERY

## 2024-10-17 PROCEDURE — 2500000003 HC RX 250 WO HCPCS: Performed by: PLASTIC SURGERY

## 2024-10-17 PROCEDURE — 3700000001 HC ADD 15 MINUTES (ANESTHESIA): Performed by: PLASTIC SURGERY

## 2024-10-17 PROCEDURE — 7100000010 HC PHASE II RECOVERY - FIRST 15 MIN: Performed by: PLASTIC SURGERY

## 2024-10-17 PROCEDURE — 3600000016 HC SURGERY LEVEL 6 ADDTL 15MIN: Performed by: PLASTIC SURGERY

## 2024-10-17 PROCEDURE — 6360000002 HC RX W HCPCS

## 2024-10-17 PROCEDURE — 6360000002 HC RX W HCPCS: Performed by: ANESTHESIOLOGY

## 2024-10-17 PROCEDURE — 19370 REVJ PERI-IMPLT CAPSULE BRST: CPT | Performed by: PLASTIC SURGERY

## 2024-10-17 PROCEDURE — 7100000011 HC PHASE II RECOVERY - ADDTL 15 MIN: Performed by: PLASTIC SURGERY

## 2024-10-17 PROCEDURE — 2720000010 HC SURG SUPPLY STERILE: Performed by: PLASTIC SURGERY

## 2024-10-17 PROCEDURE — 3700000000 HC ANESTHESIA ATTENDED CARE: Performed by: PLASTIC SURGERY

## 2024-10-17 PROCEDURE — 2580000003 HC RX 258: Performed by: PLASTIC SURGERY

## 2024-10-17 RX ORDER — DEXTROSE MONOHYDRATE 100 MG/ML
INJECTION, SOLUTION INTRAVENOUS CONTINUOUS PRN
Status: DISCONTINUED | OUTPATIENT
Start: 2024-10-17 | End: 2024-10-17 | Stop reason: HOSPADM

## 2024-10-17 RX ORDER — DIPHENHYDRAMINE HYDROCHLORIDE 50 MG/ML
12.5 INJECTION INTRAMUSCULAR; INTRAVENOUS
Status: DISCONTINUED | OUTPATIENT
Start: 2024-10-17 | End: 2024-10-17 | Stop reason: HOSPADM

## 2024-10-17 RX ORDER — HYDROMORPHONE HYDROCHLORIDE 1 MG/ML
0.5 INJECTION, SOLUTION INTRAMUSCULAR; INTRAVENOUS; SUBCUTANEOUS EVERY 5 MIN PRN
Status: COMPLETED | OUTPATIENT
Start: 2024-10-17 | End: 2024-10-17

## 2024-10-17 RX ORDER — BUPIVACAINE HYDROCHLORIDE AND EPINEPHRINE 2.5; 5 MG/ML; UG/ML
INJECTION, SOLUTION EPIDURAL; INFILTRATION; INTRACAUDAL; PERINEURAL PRN
Status: DISCONTINUED | OUTPATIENT
Start: 2024-10-17 | End: 2024-10-17 | Stop reason: ALTCHOICE

## 2024-10-17 RX ORDER — ONDANSETRON 2 MG/ML
INJECTION INTRAMUSCULAR; INTRAVENOUS
Status: DISCONTINUED | OUTPATIENT
Start: 2024-10-17 | End: 2024-10-17 | Stop reason: SDUPTHER

## 2024-10-17 RX ORDER — SODIUM CHLORIDE 0.9 % (FLUSH) 0.9 %
5-40 SYRINGE (ML) INJECTION EVERY 12 HOURS SCHEDULED
Status: DISCONTINUED | OUTPATIENT
Start: 2024-10-17 | End: 2024-10-17 | Stop reason: HOSPADM

## 2024-10-17 RX ORDER — MIDAZOLAM HYDROCHLORIDE 1 MG/ML
INJECTION INTRAMUSCULAR; INTRAVENOUS
Status: DISCONTINUED | OUTPATIENT
Start: 2024-10-17 | End: 2024-10-17 | Stop reason: SDUPTHER

## 2024-10-17 RX ORDER — IPRATROPIUM BROMIDE AND ALBUTEROL SULFATE 2.5; .5 MG/3ML; MG/3ML
1 SOLUTION RESPIRATORY (INHALATION)
Status: DISCONTINUED | OUTPATIENT
Start: 2024-10-17 | End: 2024-10-17 | Stop reason: HOSPADM

## 2024-10-17 RX ORDER — PROCHLORPERAZINE EDISYLATE 5 MG/ML
5 INJECTION INTRAMUSCULAR; INTRAVENOUS
Status: DISCONTINUED | OUTPATIENT
Start: 2024-10-17 | End: 2024-10-17 | Stop reason: HOSPADM

## 2024-10-17 RX ORDER — SODIUM CHLORIDE 9 MG/ML
INJECTION, SOLUTION INTRAVENOUS CONTINUOUS
Status: DISCONTINUED | OUTPATIENT
Start: 2024-10-17 | End: 2024-10-17 | Stop reason: HOSPADM

## 2024-10-17 RX ORDER — PROPOFOL 10 MG/ML
INJECTION, EMULSION INTRAVENOUS
Status: DISCONTINUED | OUTPATIENT
Start: 2024-10-17 | End: 2024-10-17 | Stop reason: SDUPTHER

## 2024-10-17 RX ORDER — LIDOCAINE HYDROCHLORIDE 20 MG/ML
INJECTION, SOLUTION INTRAVENOUS
Status: DISCONTINUED | OUTPATIENT
Start: 2024-10-17 | End: 2024-10-17 | Stop reason: SDUPTHER

## 2024-10-17 RX ORDER — SODIUM CHLORIDE 0.9 % (FLUSH) 0.9 %
5-40 SYRINGE (ML) INJECTION PRN
Status: DISCONTINUED | OUTPATIENT
Start: 2024-10-17 | End: 2024-10-17 | Stop reason: HOSPADM

## 2024-10-17 RX ORDER — ROCURONIUM BROMIDE 10 MG/ML
INJECTION, SOLUTION INTRAVENOUS
Status: DISCONTINUED | OUTPATIENT
Start: 2024-10-17 | End: 2024-10-17 | Stop reason: SDUPTHER

## 2024-10-17 RX ORDER — SODIUM CHLORIDE 9 MG/ML
INJECTION, SOLUTION INTRAVENOUS PRN
Status: DISCONTINUED | OUTPATIENT
Start: 2024-10-17 | End: 2024-10-17 | Stop reason: HOSPADM

## 2024-10-17 RX ORDER — FENTANYL CITRATE 50 UG/ML
INJECTION, SOLUTION INTRAMUSCULAR; INTRAVENOUS
Status: DISCONTINUED | OUTPATIENT
Start: 2024-10-17 | End: 2024-10-17 | Stop reason: SDUPTHER

## 2024-10-17 RX ORDER — DEXAMETHASONE SODIUM PHOSPHATE 10 MG/ML
INJECTION INTRAMUSCULAR; INTRAVENOUS
Status: DISCONTINUED | OUTPATIENT
Start: 2024-10-17 | End: 2024-10-17 | Stop reason: SDUPTHER

## 2024-10-17 RX ORDER — CEPHALEXIN 500 MG/1
500 CAPSULE ORAL 4 TIMES DAILY
Qty: 20 CAPSULE | Refills: 0 | Status: SHIPPED | OUTPATIENT
Start: 2024-10-17 | End: 2024-10-22

## 2024-10-17 RX ORDER — SCOLOPAMINE TRANSDERMAL SYSTEM 1 MG/1
1 PATCH, EXTENDED RELEASE TRANSDERMAL ONCE
Status: DISCONTINUED | OUTPATIENT
Start: 2024-10-17 | End: 2024-10-17 | Stop reason: HOSPADM

## 2024-10-17 RX ORDER — ONDANSETRON 4 MG/1
4 TABLET, FILM COATED ORAL DAILY PRN
Qty: 12 TABLET | Refills: 1 | Status: SHIPPED | OUTPATIENT
Start: 2024-10-17

## 2024-10-17 RX ORDER — PHENYLEPHRINE HCL IN 0.9% NACL 1 MG/10 ML
SYRINGE (ML) INTRAVENOUS
Status: DISCONTINUED | OUTPATIENT
Start: 2024-10-17 | End: 2024-10-17 | Stop reason: SDUPTHER

## 2024-10-17 RX ORDER — FENTANYL CITRATE 50 UG/ML
25 INJECTION, SOLUTION INTRAMUSCULAR; INTRAVENOUS EVERY 5 MIN PRN
Status: DISCONTINUED | OUTPATIENT
Start: 2024-10-17 | End: 2024-10-17 | Stop reason: HOSPADM

## 2024-10-17 RX ORDER — ONDANSETRON 2 MG/ML
4 INJECTION INTRAMUSCULAR; INTRAVENOUS
Status: DISCONTINUED | OUTPATIENT
Start: 2024-10-17 | End: 2024-10-17 | Stop reason: HOSPADM

## 2024-10-17 RX ORDER — NALOXONE HYDROCHLORIDE 0.4 MG/ML
INJECTION, SOLUTION INTRAMUSCULAR; INTRAVENOUS; SUBCUTANEOUS PRN
Status: DISCONTINUED | OUTPATIENT
Start: 2024-10-17 | End: 2024-10-17 | Stop reason: HOSPADM

## 2024-10-17 RX ORDER — OXYCODONE AND ACETAMINOPHEN 5; 325 MG/1; MG/1
1 TABLET ORAL EVERY 6 HOURS PRN
Qty: 15 TABLET | Refills: 0 | Status: SHIPPED | OUTPATIENT
Start: 2024-10-17 | End: 2024-10-24

## 2024-10-17 RX ADMIN — FENTANYL CITRATE 25 MCG: 50 INJECTION INTRAMUSCULAR; INTRAVENOUS at 12:12

## 2024-10-17 RX ADMIN — CEFAZOLIN 2000 MG: 2 INJECTION, POWDER, FOR SOLUTION INTRAMUSCULAR; INTRAVENOUS at 10:14

## 2024-10-17 RX ADMIN — PROPOFOL 100 MG: 10 INJECTION, EMULSION INTRAVENOUS at 10:03

## 2024-10-17 RX ADMIN — SODIUM CHLORIDE: 9 INJECTION, SOLUTION INTRAVENOUS at 09:52

## 2024-10-17 RX ADMIN — Medication 50 MCG: at 10:50

## 2024-10-17 RX ADMIN — SUGAMMADEX 200 MG: 100 INJECTION, SOLUTION INTRAVENOUS at 11:03

## 2024-10-17 RX ADMIN — MIDAZOLAM 2 MG: 1 INJECTION INTRAMUSCULAR; INTRAVENOUS at 09:52

## 2024-10-17 RX ADMIN — HYDROMORPHONE HYDROCHLORIDE 0.5 MG: 1 INJECTION, SOLUTION INTRAMUSCULAR; INTRAVENOUS; SUBCUTANEOUS at 11:37

## 2024-10-17 RX ADMIN — HYDROMORPHONE HYDROCHLORIDE 0.5 MG: 1 INJECTION, SOLUTION INTRAMUSCULAR; INTRAVENOUS; SUBCUTANEOUS at 11:59

## 2024-10-17 RX ADMIN — ROCURONIUM BROMIDE 20 MG: 10 INJECTION, SOLUTION INTRAVENOUS at 10:09

## 2024-10-17 RX ADMIN — DEXAMETHASONE SODIUM PHOSPHATE 10 MG: 10 INJECTION INTRAMUSCULAR; INTRAVENOUS at 10:03

## 2024-10-17 RX ADMIN — PROPOFOL 50 MG: 10 INJECTION, EMULSION INTRAVENOUS at 10:08

## 2024-10-17 RX ADMIN — LIDOCAINE HYDROCHLORIDE 100 MG: 20 INJECTION, SOLUTION INTRAVENOUS at 10:03

## 2024-10-17 RX ADMIN — FENTANYL CITRATE 100 MCG: 0.05 INJECTION, SOLUTION INTRAMUSCULAR; INTRAVENOUS at 10:03

## 2024-10-17 RX ADMIN — ONDANSETRON 4 MG: 2 INJECTION INTRAMUSCULAR; INTRAVENOUS at 10:55

## 2024-10-17 ASSESSMENT — PAIN SCALES - GENERAL
PAINLEVEL_OUTOF10: 8
PAINLEVEL_OUTOF10: 6
PAINLEVEL_OUTOF10: 8

## 2024-10-17 ASSESSMENT — PAIN DESCRIPTION - DESCRIPTORS
DESCRIPTORS: ACHING;DISCOMFORT
DESCRIPTORS: ACHING;BURNING;DISCOMFORT
DESCRIPTORS: ACHING;DISCOMFORT

## 2024-10-17 ASSESSMENT — PAIN DESCRIPTION - LOCATION
LOCATION: BREAST

## 2024-10-17 ASSESSMENT — PAIN - FUNCTIONAL ASSESSMENT
PAIN_FUNCTIONAL_ASSESSMENT: NONE - DENIES PAIN
PAIN_FUNCTIONAL_ASSESSMENT: 0-10

## 2024-10-17 ASSESSMENT — PAIN DESCRIPTION - ORIENTATION
ORIENTATION: RIGHT
ORIENTATION: RIGHT

## 2024-10-17 NOTE — OP NOTE
Operative Note      Patient: Luly Alatorre  YOB: 1945  MRN: [id number]    Date of Procedure: 10/17/2024    Pre-Op Diagnosis Codes:      * Hx of breast reconstruction [Z98.890]    Post-Op Diagnosis: Same       Procedure(s):  revision of right breast reconstruction with elevation of inframmory fold,possible right breast implant exchange    Surgeon(s):  Trell Segal MD    Assistant:   Physician Assistant: Leandro Duenas PA    Anesthesia: General    Estimated Blood Loss (mL): 5cc    Complications: None    Specimens:   ID Type Source Tests Collected by Time Destination   A : Right Breast Mastectomy Scar tissue Tissue Tissue SURGICAL PATHOLOGY Trell Segal MD 10/17/2024 1040        Implants:  Implant Name Type Inv. Item Serial No.  Lot No. LRB No. Used Action   IMPLANT BRST 390CC DIA11.9-11.5CM P4.4-5.2CM RITA NACL RND - [id number]  IMPLANT BRST 390CC DIA11.9-11.5CM P4.4-5.2CM RITA NACL RND [id number] MENTOR DONAVON-WD [id number] Right 1 Implanted         Drains: * No LDAs found *    Findings:  Infection Present At Time Of Surgery (PATOS) (choose all levels that have infection present):  No infection present        ASSISTANT:  Leandro Duenas PA-C.  PA was required for the case due  to lack of adequately trained assistant; was involved in prepping,  draping, retracting, dressing and suturing.           PREOPERATIVE INDICATIONS:  The patient is a 79 y.o. female with history of previous breast reconstruction..  The patient elected to proceed with revision right breast reconstruction with kajal-implant capsule adjustment with removal and replacement of saline breast implant...  Risks, benefits and alternatives were explained to the patient including but not limited to bleeding, scarring, infection, death, and need for further surgery.  The patient understood and elected to proceed.  They were seen on the day of surgery, marked and all questions were answered.  The patient was  anesthesia without complication and taken to PACU in good condition.        Electronically signed by Trell Segal MD on 10/17/2024 at 12:29 PM

## 2024-10-17 NOTE — ANESTHESIA POSTPROCEDURE EVALUATION
Department of Anesthesiology  Postprocedure Note    Patient: Luly Alatorre  MRN: [id number]  YOB: 1945  Date of evaluation: 10/17/2024    Procedure Summary       Date: 10/17/24 Room / Location: [address]    Anesthesia Start: 0952 Anesthesia Stop: 1116    Procedure: revision of right breast reconstruction with elevation of inframmory fold,possible right breast implant exchange (Right: Breast) Diagnosis:       Hx of breast reconstruction      (Hx of breast reconstruction [Z98.890])    Surgeons: Trell Segal MD Responsible Provider: Jarad Pineda DO    Anesthesia Type: General ASA Status: 3            Anesthesia Type: General    Babs Phase I: Babs Score: 9    Babs Phase II:      Anesthesia Post Evaluation    Patient location during evaluation: PACU  Patient participation: complete - patient participated  Level of consciousness: awake  Pain score: 3  Airway patency: patent  Nausea & Vomiting: no nausea and no vomiting  Cardiovascular status: hemodynamically stable  Respiratory status: acceptable  Hydration status: stable  Pain management: adequate    No notable events documented.

## 2024-10-17 NOTE — DISCHARGE INSTRUCTIONS
Discharge Home.   Call office to schedule a follow-up appointment at [phone]  Please call to schedule an appointment to be seen In our office on Monday 10-28-24  Diet: regular diet  Activity: ambulate in house and no Strenuous exercise for 1 week    Shower/Bathing:  You can shower in 48 hours over the incision site.  At that time you can allow soap and water to rinse off the incision site while showering.  Once you are done in the shower you can pat dry the incision site with a clean paper towel.  No baths, hot tubs or soaking of the wound site at this time.     Dressings /Splint /Wound Care:Keep wound clean and dry.  There is no need to remove your steri strips.  Your surgical bra needs to be worn at all times.  Your bra may become saturated with drainage this is to be expected.  The bra can be off for period of time to have it washed and dried.  You can use gauze padding as needed for comfort under the surgical bra.  This gauze dressing can be changed as needed if the gauze becomes saturated.  At any point during the day if you notice any sudden changes to the appearance of the wound or the site operated on you will need to contact our office.  This may include opening of wound, pus, changes in size, color etc.        Driving: It is OK to drive if the following criteria are met:   1- Not taking narcotic pain medication   2- Not distracted by pain or limited ROM   3- Not a hazard to self or others on the road      Medications: You have been prescribed a narcotic pain medication but your doctor wants you to take Tylenol every 6 hours for the next 4 days as your baseline pain medication.  We do not expect you to be pain-free.  You just had surgery and this is not realistic.  Your pain should be mild to moderate at a level you should be able to handle.  If your pain is too severe, you may take up to 1 tab of your prescribed narcotic every 6 hours, but your doctor hope you do not need the narcotics so you can

## 2024-10-17 NOTE — ANESTHESIA PRE PROCEDURE
Department of Anesthesiology  Preprocedure Note       Name:  Luly Alatorre   Age:  79 y.o.  :  1945                                          MRN:  [id number]         Date:  10/17/2024      Surgeon: Surgeon(s):  Trell Segal MD    Procedure: Procedure(s):  revision of right breast reconstruction with elevation of inframmory fold,possible right breast implant exchange    Medications prior to admission:   Prior to Admission medications    Medication Sig Start Date End Date Taking? Authorizing Provider   Naproxen Sodium (ALEVE PO) Take by mouth   Yes Arcadio Banerjee MD   montelukast (SINGULAIR) 10 MG tablet Take 1 tablet by mouth nightly   Yes Arcadio Banerjee MD   loratadine (CLARITIN) 10 MG tablet Take 1 tablet by mouth daily   Yes Arcadio Banerjee MD   spironolactone (ALDACTONE) 100 MG tablet Take 1 tablet by mouth daily 6/3/17  Yes Arcadio Banerjee MD   ondansetron (ZOFRAN) 4 MG tablet Take 1 tablet by mouth daily as needed for Nausea or Vomiting  Patient not taking: Reported on 10/17/2024 8/1/24   Leandro Duenas PA   Biotin 64374 MCG TABS Take 1 tablet by mouth 2 times daily     Arcadio Banerjee MD   Calcium Carbonate-Vitamin D (CALTRATE 600+D PO) Take 1 tablet by mouth daily     Arcadio Banerjee MD   Vitamin D (CHOLECALCIFEROL) 25 MCG (1000 UT) TABS tablet Take 1 tablet by mouth daily    Arcadio Banerjee MD   Calcium 500-100 MG-UNIT CHEW Take 1 tablet by mouth daily     Arcadio Banerjee MD   Multiple Vitamins-Minerals (PRESERVISION AREDS 2 PO) Take 1 tablet by mouth in the morning and 1 tablet in the evening.    Arcadio Banerjee MD   Multiple Vitamins-Minerals (CENTRUM SILVER 50+WOMEN PO) Take 1 tablet by mouth daily     Arcadio aBnerjee MD   levothyroxine (SYNTHROID) 88 MCG tablet Take 1 tablet by mouth    Arcadio Banerjee MD   MAGNESIUM PO Take 250 mg by mouth 3/12/13   Arcadio Banerjee MD   Omega-3 Fatty Acids (OMEGA 3 PO) Take

## 2024-10-22 LAB — SURGICAL PATHOLOGY REPORT: NORMAL

## 2024-10-28 ENCOUNTER — OFFICE VISIT (OUTPATIENT)
Dept: SURGERY | Age: 79
End: 2024-10-28

## 2024-10-28 VITALS — TEMPERATURE: 97.3 F

## 2024-10-28 DIAGNOSIS — Z98.82 HISTORY OF BILATERAL BREAST IMPLANTS: ICD-10-CM

## 2024-10-28 DIAGNOSIS — Z98.890 HX OF BREAST RECONSTRUCTION: Primary | ICD-10-CM

## 2024-10-28 DIAGNOSIS — Z98.82 S/P SALINE BREAST IMPLANT: ICD-10-CM

## 2024-10-28 PROCEDURE — 99024 POSTOP FOLLOW-UP VISIT: CPT | Performed by: PHYSICIAN ASSISTANT

## 2024-10-28 NOTE — PROGRESS NOTES
Subjective:    Follow up today from revision of right breast reconstruction with elevation of inframmory fold,possible right breast implant exchange - Right 10/17/2024 . Denies fever, nausea, vomiting, leg pain or swelling, pain is absent.  The pt states she is finished taking her antibiotic and continues to wear her surgical bra.    She is ambulating at home.  She is  wearing her surgical bra at all times.    Objective:    Temp 97.3 °F (36.3 °C) (Temporal)       Left Breast- Clean, dry, and intact, no drainage.  Well-healed scar maturing as expected from prior surgical intervention  Right Breast Clean, dry, and intact, no drainage. Steri strips intact, no signs of infection.         Assessment:    Patient Active Problem List   Diagnosis    History of bilateral breast implants    Post-op pain    Hx of breast reconstruction      Pathology Report Path Number: [id number]    -- Diagnosis --  Skin of right breast, excision: Cutaneous scar with focal suture  granuloma.     Plan:     Status Post : revision of right breast reconstruction with elevation of inframmory fold,possible right breast implant exchange - Right 10/17/2024      -Continue wearing surgical bra at all times  -Educated the pt that her pain is proportionate to the amount of surgery she had and she may begin using Ibuprofen in addition to pain medication.    -ABX to completion  -Pain control PRN  -No driving while taking narcotic pain medication or while impaired second to limited UE  ROM  -No heavy lifting at this time  -OK to shower at this time.  Advised the patient that they can allow soap and water to rinse of the incision site while showering.  Once they are done in the shower they are to pat dry the incision site with a clean paper towel.  No baths, hot tubs or soaking of the wound site at this time.  Pt voices understanding.       F/U in 3 weeks.    Call office with concerns or signs of infection.      JAMES Juárez   1:36

## 2024-11-18 ENCOUNTER — OFFICE VISIT (OUTPATIENT)
Dept: SURGERY | Age: 79
End: 2024-11-18

## 2024-11-18 VITALS
TEMPERATURE: 97.2 F | HEART RATE: 80 BPM | DIASTOLIC BLOOD PRESSURE: 70 MMHG | SYSTOLIC BLOOD PRESSURE: 136 MMHG | OXYGEN SATURATION: 98 % | RESPIRATION RATE: 20 BRPM

## 2024-11-18 DIAGNOSIS — Z98.890 HX OF BREAST RECONSTRUCTION: Primary | ICD-10-CM

## 2024-11-18 PROCEDURE — 99024 POSTOP FOLLOW-UP VISIT: CPT | Performed by: PHYSICIAN ASSISTANT

## 2024-11-18 NOTE — PROGRESS NOTES
Subjective:    Follow up today from revision of right breast reconstruction with elevation of inframmory fold,possible right breast implant exchange - Right 10/17/2024 . Denies fever, nausea, vomiting, leg pain or swelling, pain is absent.  She presents today for continued wound surveillance.  She has concerns with her right upper breast pole.  She states that \"it just does not look right\"      Objective:    /70 (Site: Left Upper Arm, Position: Sitting, Cuff Size: Medium Adult) Comment: pt stated when rushing goes up but normally in normal range  Pulse 80   Temp 97.2 °F (36.2 °C) (Infrared)   Resp 20   SpO2 98%       Left Breast- Clean, dry, and intact, no drainage.  Well-healed scar maturing as expected from prior surgical intervention  Right Breast Clean, dry, and intact, no drainage. Steri strips intact, no signs of infection.   After removal of Steri-Strips incision is well-healed there are no masses or fluid collections on palpation there is some notable step-off of the upper breast pole to the chest wall.      Assessment:    Patient Active Problem List   Diagnosis    History of bilateral breast implants    Post-op pain    Hx of breast reconstruction      Pathology Report Path Number: [id number]    -- Diagnosis --  Skin of right breast, excision: Cutaneous scar with focal suture  granuloma.     Plan:     Status Post : revision of right breast reconstruction with elevation of inframmory fold,possible right breast implant exchange - Right 10/17/2024      I informed the patient her incisions are well-healed and at this time she has no restrictions.  I addressed the patient's concerns of her right upper breast pole step-off to the upper chest wall and that she is only 1 month out from surgical intervention that this may need time to fully settle she cannot make any determinations on her final cosmesis until least the 3-month royal.  Patient stated she was still not happy with the cosmetic result to date.

## 2025-01-06 NOTE — PROGRESS NOTES
Subjective:    Follow up today from revision of right breast reconstruction with elevation of inframmory fold,possible right breast implant exchange - Right 10/17/2024 . Denies fever, nausea, vomiting, leg pain or swelling, pain is absent.  She presents today for continued wound surveillance.  She has concerns with her right upper breast pole.  She states that \"it just does not look right\"      Objective:    There were no vitals taken for this visit.      Left Breast- Clean, dry, and intact, no drainage.  Well-healed scar maturing as expected from prior surgical intervention  Right Breast Clean, dry, and intact, no drainage. Steri strips intact, no signs of infection.   Further bottoming out of right breast causing concavity at right superior breast pole and asymmetric IMF. No masses to palpation.        Assessment:    Patient Active Problem List   Diagnosis    History of bilateral breast implants    Post-op pain    Hx of breast reconstruction      Pathology Report Path Number: [id number]    -- Diagnosis --  Skin of right breast, excision: Cutaneous scar with focal suture  granuloma.     Plan:     Status Post : revision of right breast reconstruction with elevation of inframmory fold,possible right breast implant exchange - Right 10/17/2024        Despite the attempts to raise the inframammary fold on the right side with anchoring sutures, the patient has settled and bottomed out yet again.  This is giving the appearance of of concavity in the upper pole and discrepancy in her inframammary fold.  I have given the patient her options and risks and benefits.  She can elect to do nothing and utilize a bra to place the implant, we can do a secondary revision with the use of a dermal matrix in hopes that anchoring of this will afford more support.  However, however I have informed the patient that likely anchoring sutures eventually pulled from the costal tissues, this could also happen with an acellular dermal matrix

## 2025-01-15 ENCOUNTER — OFFICE VISIT (OUTPATIENT)
Dept: SURGERY | Age: 80
End: 2025-01-15

## 2025-01-15 VITALS
HEART RATE: 81 BPM | DIASTOLIC BLOOD PRESSURE: 69 MMHG | SYSTOLIC BLOOD PRESSURE: 134 MMHG | TEMPERATURE: 97.2 F | OXYGEN SATURATION: 99 %

## 2025-01-15 DIAGNOSIS — Z98.890 HX OF BREAST RECONSTRUCTION: Primary | ICD-10-CM

## 2025-01-15 DIAGNOSIS — Z98.82 HISTORY OF BILATERAL BREAST IMPLANTS: ICD-10-CM

## 2025-01-15 PROCEDURE — 99024 POSTOP FOLLOW-UP VISIT: CPT | Performed by: PLASTIC SURGERY

## 2025-03-17 ENCOUNTER — HOSPITAL ENCOUNTER (OUTPATIENT)
Age: 80
Discharge: HOME | End: 2025-03-17
Payer: MEDICARE

## 2025-03-17 DIAGNOSIS — Z98.890 HX OF BREAST RECONSTRUCTION: ICD-10-CM

## 2025-03-17 LAB
ANION GAP SERPL CALCULATED.3IONS-SCNC: 8 MMOL/L (ref 7–16)
BUN SERPL-MCNC: 29 MG/DL (ref 6–23)
CALCIUM SERPL-MCNC: 9.8 MG/DL (ref 8.6–10.2)
CHLORIDE SERPL-SCNC: 103 MMOL/L (ref 98–107)
CO2 SERPL-SCNC: 29 MMOL/L (ref 22–29)
CREAT SERPL-MCNC: 0.8 MG/DL (ref 0.5–1)
GFR, ESTIMATED: 73 ML/MIN/1.73M2
GLUCOSE SERPL-MCNC: 95 MG/DL (ref 74–99)
POTASSIUM SERPL-SCNC: 4.1 MMOL/L (ref 3.5–5)
SODIUM SERPL-SCNC: 140 MMOL/L (ref 132–146)

## 2025-03-17 PROCEDURE — 80048 BASIC METABOLIC PNL TOTAL CA: CPT

## 2025-03-17 PROCEDURE — 36415 COLL VENOUS BLD VENIPUNCTURE: CPT

## 2025-03-17 PROCEDURE — 93005 ELECTROCARDIOGRAM TRACING: CPT | Performed by: ANESTHESIOLOGY

## 2025-03-17 RX ORDER — CYANOCOBALAMIN (VITAMIN B-12) 500 MCG
TABLET ORAL
COMMUNITY

## 2025-03-17 RX ORDER — MULTIVIT WITH MINERALS/LUTEIN
400 TABLET ORAL DAILY
COMMUNITY

## 2025-03-17 RX ORDER — FEXOFENADINE HCL 180 MG/1
180 TABLET ORAL DAILY
COMMUNITY

## 2025-03-17 RX ORDER — ACETAMINOPHEN 325 MG/1
650 TABLET ORAL EVERY 6 HOURS PRN
COMMUNITY

## 2025-03-19 LAB
EKG ATRIAL RATE: 59 BPM
EKG P AXIS: 79 DEGREES
EKG P-R INTERVAL: 186 MS
EKG Q-T INTERVAL: 460 MS
EKG QRS DURATION: 76 MS
EKG QTC CALCULATION (BAZETT): 455 MS
EKG R AXIS: 82 DEGREES
EKG T AXIS: 82 DEGREES
EKG VENTRICULAR RATE: 59 BPM

## 2025-03-21 ENCOUNTER — ANESTHESIA EVENT (OUTPATIENT)
Dept: OPERATING ROOM | Age: 80
End: 2025-03-21
Payer: COMMERCIAL

## 2025-03-21 NOTE — ANESTHESIA PRE PROCEDURE
Department of Anesthesiology  Preprocedure Note       Name:  Luly Alatorre   Age:  79 y.o.  :  1945                                          MRN:  [id number]         Date:  3/21/2025      Surgeon: Surgeon(s):  Bunny Madsen MD    Procedure: Procedure(s):  BILATERAL BREAST AUGMENTATION WITH PLACEMENT OF MESH    Medications prior to admission:   Prior to Admission medications    Medication Sig Start Date End Date Taking? Authorizing Provider   fexofenadine (ALLEGRA ALLERGY) 180 MG tablet Take 1 tablet by mouth daily   Yes Arcadio Banerjee MD   acetaminophen (TYLENOL) 325 MG tablet Take 2 tablets by mouth every 6 hours as needed for Pain   Yes Arcadio Banerjee MD   Cyanocobalamin (VITAMIN B 12) 500 MCG TABS Take by mouth   Yes Arcadio Banerjee MD   vitamin E 400 UNIT capsule Take 1 capsule by mouth daily   Yes Arcadio Banerjee MD   montelukast (SINGULAIR) 10 MG tablet Take 1 tablet by mouth nightly    Arcadio Banerjee MD   Biotin 54931 MCG TABS Take 1 tablet by mouth 2 times daily     Arcadio Banerjee MD   Calcium Carbonate-Vitamin D (CALTRATE 600+D PO) Take 1 tablet by mouth daily     Arcadio Banerjee MD   Vitamin D (CHOLECALCIFEROL) 25 MCG (1000 UT) TABS tablet Take 1 tablet by mouth daily    Arcadio Banerjee MD   Calcium 500-100 MG-UNIT CHEW Take 1 tablet by mouth daily     Arcadio Banerjee MD   Multiple Vitamins-Minerals (PRESERVISION AREDS 2 PO) Take 1 tablet by mouth in the morning and 1 tablet in the evening.    Arcadio Banerjee MD   Multiple Vitamins-Minerals (CENTRUM SILVER 50+WOMEN PO) Take 1 tablet by mouth daily     Arcadio Banerjee MD   levothyroxine (SYNTHROID) 88 MCG tablet Take 1 tablet by mouth    Arcadio Banerjee MD   MAGNESIUM PO Take 250 mg by mouth 3/12/13   Arcadio Banerjee MD   spironolactone (ALDACTONE) 100 MG tablet Take 1 tablet by mouth daily 6/3/17   Arcadio Banerjee MD   Omega-3 Fatty Acids (OMEGA 3 PO)

## 2025-03-24 ENCOUNTER — HOSPITAL ENCOUNTER (OUTPATIENT)
Age: 80
Setting detail: OUTPATIENT SURGERY
Discharge: HOME | End: 2025-03-24
Attending: PLASTIC SURGERY | Admitting: PLASTIC SURGERY
Payer: COMMERCIAL

## 2025-03-24 ENCOUNTER — ANESTHESIA (OUTPATIENT)
Dept: OPERATING ROOM | Age: 80
End: 2025-03-24
Payer: COMMERCIAL

## 2025-03-24 VITALS
SYSTOLIC BLOOD PRESSURE: 119 MMHG | TEMPERATURE: 97 F | DIASTOLIC BLOOD PRESSURE: 64 MMHG | HEART RATE: 73 BPM | RESPIRATION RATE: 16 BRPM | WEIGHT: 106 LBS | BODY MASS INDEX: 18.78 KG/M2 | OXYGEN SATURATION: 100 % | HEIGHT: 63 IN

## 2025-03-24 DIAGNOSIS — Z98.890 HX OF BREAST RECONSTRUCTION: ICD-10-CM

## 2025-03-24 DIAGNOSIS — Z98.82 HISTORY OF BILATERAL BREAST IMPLANTS: Primary | ICD-10-CM

## 2025-03-24 PROCEDURE — 2580000003 HC RX 258: Performed by: ANESTHESIOLOGY

## 2025-03-24 PROCEDURE — 6360000002 HC RX W HCPCS: Performed by: ANESTHESIOLOGY

## 2025-03-24 PROCEDURE — 6360000002 HC RX W HCPCS: Performed by: PLASTIC SURGERY

## 2025-03-24 PROCEDURE — 2500000003 HC RX 250 WO HCPCS: Performed by: PLASTIC SURGERY

## 2025-03-24 PROCEDURE — 6360000002 HC RX W HCPCS

## 2025-03-24 PROCEDURE — 6370000000 HC RX 637 (ALT 250 FOR IP)

## 2025-03-24 PROCEDURE — 3700000001 HC ADD 15 MINUTES (ANESTHESIA): Performed by: PLASTIC SURGERY

## 2025-03-24 PROCEDURE — 3600000004 HC SURGERY LEVEL 4 BASE: Performed by: PLASTIC SURGERY

## 2025-03-24 PROCEDURE — 7100000010 HC PHASE II RECOVERY - FIRST 15 MIN: Performed by: PLASTIC SURGERY

## 2025-03-24 PROCEDURE — 7100000000 HC PACU RECOVERY - FIRST 15 MIN: Performed by: PLASTIC SURGERY

## 2025-03-24 PROCEDURE — 2709999900 HC NON-CHARGEABLE SUPPLY: Performed by: PLASTIC SURGERY

## 2025-03-24 PROCEDURE — 2500000003 HC RX 250 WO HCPCS

## 2025-03-24 PROCEDURE — 7100000011 HC PHASE II RECOVERY - ADDTL 15 MIN: Performed by: PLASTIC SURGERY

## 2025-03-24 PROCEDURE — 3600000014 HC SURGERY LEVEL 4 ADDTL 15MIN: Performed by: PLASTIC SURGERY

## 2025-03-24 PROCEDURE — 7100000001 HC PACU RECOVERY - ADDTL 15 MIN: Performed by: PLASTIC SURGERY

## 2025-03-24 PROCEDURE — 3700000000 HC ANESTHESIA ATTENDED CARE: Performed by: PLASTIC SURGERY

## 2025-03-24 DEVICE — SMOOTH MODERATE HIGH PROFILE XTRA, 280 CC  SMOOTH ROUND SILICONE
Type: IMPLANTABLE DEVICE | Site: BREAST | Status: FUNCTIONAL
Brand: MENTOR MEMORYGEL XTRA BREAST IMPLANT

## 2025-03-24 DEVICE — IMPLANTABLE DEVICE: Type: IMPLANTABLE DEVICE | Site: BREAST | Status: FUNCTIONAL

## 2025-03-24 RX ORDER — ACETAMINOPHEN 500 MG
500 TABLET ORAL EVERY 4 HOURS PRN
Status: DISCONTINUED | OUTPATIENT
Start: 2025-03-24 | End: 2025-03-24 | Stop reason: HOSPADM

## 2025-03-24 RX ORDER — ACETAMINOPHEN 500 MG
TABLET ORAL
Status: COMPLETED
Start: 2025-03-24 | End: 2025-03-24

## 2025-03-24 RX ORDER — GLYCOPYRROLATE 0.2 MG/ML
INJECTION INTRAMUSCULAR; INTRAVENOUS
Status: DISCONTINUED | OUTPATIENT
Start: 2025-03-24 | End: 2025-03-24 | Stop reason: SDUPTHER

## 2025-03-24 RX ORDER — SODIUM CHLORIDE 0.9 % (FLUSH) 0.9 %
5-40 SYRINGE (ML) INJECTION EVERY 12 HOURS SCHEDULED
Status: DISCONTINUED | OUTPATIENT
Start: 2025-03-24 | End: 2025-03-24 | Stop reason: HOSPADM

## 2025-03-24 RX ORDER — SODIUM CHLORIDE 0.9 % (FLUSH) 0.9 %
5-40 SYRINGE (ML) INJECTION PRN
Status: DISCONTINUED | OUTPATIENT
Start: 2025-03-24 | End: 2025-03-24 | Stop reason: HOSPADM

## 2025-03-24 RX ORDER — SODIUM CHLORIDE 9 MG/ML
INJECTION, SOLUTION INTRAVENOUS PRN
Status: DISCONTINUED | OUTPATIENT
Start: 2025-03-24 | End: 2025-03-24 | Stop reason: HOSPADM

## 2025-03-24 RX ORDER — ONDANSETRON 2 MG/ML
INJECTION INTRAMUSCULAR; INTRAVENOUS
Status: DISCONTINUED | OUTPATIENT
Start: 2025-03-24 | End: 2025-03-24 | Stop reason: SDUPTHER

## 2025-03-24 RX ORDER — NALOXONE HYDROCHLORIDE 0.4 MG/ML
INJECTION, SOLUTION INTRAMUSCULAR; INTRAVENOUS; SUBCUTANEOUS PRN
Status: DISCONTINUED | OUTPATIENT
Start: 2025-03-24 | End: 2025-03-24 | Stop reason: HOSPADM

## 2025-03-24 RX ORDER — MIDAZOLAM HYDROCHLORIDE 1 MG/ML
2 INJECTION, SOLUTION INTRAMUSCULAR; INTRAVENOUS
Status: DISCONTINUED | OUTPATIENT
Start: 2025-03-24 | End: 2025-03-24 | Stop reason: HOSPADM

## 2025-03-24 RX ORDER — PROCHLORPERAZINE EDISYLATE 5 MG/ML
10 INJECTION INTRAMUSCULAR; INTRAVENOUS EVERY 6 HOURS PRN
Status: DISCONTINUED | OUTPATIENT
Start: 2025-03-24 | End: 2025-03-24 | Stop reason: HOSPADM

## 2025-03-24 RX ORDER — IPRATROPIUM BROMIDE AND ALBUTEROL SULFATE 2.5; .5 MG/3ML; MG/3ML
1 SOLUTION RESPIRATORY (INHALATION)
Status: DISCONTINUED | OUTPATIENT
Start: 2025-03-24 | End: 2025-03-24 | Stop reason: HOSPADM

## 2025-03-24 RX ORDER — PROPOFOL 10 MG/ML
INJECTION, EMULSION INTRAVENOUS
Status: DISCONTINUED | OUTPATIENT
Start: 2025-03-24 | End: 2025-03-24 | Stop reason: SDUPTHER

## 2025-03-24 RX ORDER — HYDRALAZINE HYDROCHLORIDE 20 MG/ML
10 INJECTION INTRAMUSCULAR; INTRAVENOUS
Status: DISCONTINUED | OUTPATIENT
Start: 2025-03-24 | End: 2025-03-24 | Stop reason: HOSPADM

## 2025-03-24 RX ORDER — ROCURONIUM BROMIDE 10 MG/ML
INJECTION, SOLUTION INTRAVENOUS
Status: DISCONTINUED | OUTPATIENT
Start: 2025-03-24 | End: 2025-03-24 | Stop reason: SDUPTHER

## 2025-03-24 RX ORDER — FENTANYL CITRATE 50 UG/ML
INJECTION, SOLUTION INTRAMUSCULAR; INTRAVENOUS
Status: DISCONTINUED | OUTPATIENT
Start: 2025-03-24 | End: 2025-03-24 | Stop reason: SDUPTHER

## 2025-03-24 RX ORDER — MEPERIDINE HYDROCHLORIDE 25 MG/ML
12.5 INJECTION INTRAMUSCULAR; INTRAVENOUS; SUBCUTANEOUS EVERY 5 MIN PRN
Status: DISCONTINUED | OUTPATIENT
Start: 2025-03-24 | End: 2025-03-24 | Stop reason: HOSPADM

## 2025-03-24 RX ORDER — ACETAMINOPHEN 325 MG/1
650 TABLET ORAL EVERY 4 HOURS PRN
Status: DISCONTINUED | OUTPATIENT
Start: 2025-03-24 | End: 2025-03-24

## 2025-03-24 RX ORDER — SODIUM CHLORIDE, SODIUM LACTATE, POTASSIUM CHLORIDE, CALCIUM CHLORIDE 600; 310; 30; 20 MG/100ML; MG/100ML; MG/100ML; MG/100ML
INJECTION, SOLUTION INTRAVENOUS CONTINUOUS
Status: DISCONTINUED | OUTPATIENT
Start: 2025-03-24 | End: 2025-03-24 | Stop reason: HOSPADM

## 2025-03-24 RX ORDER — BUPIVACAINE HYDROCHLORIDE 5 MG/ML
INJECTION, SOLUTION PERINEURAL PRN
Status: DISCONTINUED | OUTPATIENT
Start: 2025-03-24 | End: 2025-03-24 | Stop reason: ALTCHOICE

## 2025-03-24 RX ORDER — NEOSTIGMINE METHYLSULFATE 1 MG/ML
INJECTION, SOLUTION INTRAVENOUS
Status: DISCONTINUED | OUTPATIENT
Start: 2025-03-24 | End: 2025-03-24 | Stop reason: SDUPTHER

## 2025-03-24 RX ORDER — LIDOCAINE HYDROCHLORIDE 20 MG/ML
INJECTION, SOLUTION INTRAVENOUS
Status: DISCONTINUED | OUTPATIENT
Start: 2025-03-24 | End: 2025-03-24 | Stop reason: SDUPTHER

## 2025-03-24 RX ORDER — MIDAZOLAM HYDROCHLORIDE 1 MG/ML
INJECTION, SOLUTION INTRAMUSCULAR; INTRAVENOUS
Status: DISCONTINUED | OUTPATIENT
Start: 2025-03-24 | End: 2025-03-24 | Stop reason: SDUPTHER

## 2025-03-24 RX ORDER — SODIUM CHLORIDE 9 MG/ML
INJECTION, SOLUTION INTRAVENOUS CONTINUOUS
Status: DISCONTINUED | OUTPATIENT
Start: 2025-03-24 | End: 2025-03-24 | Stop reason: HOSPADM

## 2025-03-24 RX ORDER — DEXAMETHASONE SODIUM PHOSPHATE 10 MG/ML
INJECTION, SOLUTION INTRAMUSCULAR; INTRAVENOUS
Status: DISCONTINUED | OUTPATIENT
Start: 2025-03-24 | End: 2025-03-24 | Stop reason: SDUPTHER

## 2025-03-24 RX ORDER — LABETALOL HYDROCHLORIDE 5 MG/ML
10 INJECTION, SOLUTION INTRAVENOUS
Status: DISCONTINUED | OUTPATIENT
Start: 2025-03-24 | End: 2025-03-24 | Stop reason: HOSPADM

## 2025-03-24 RX ORDER — ONDANSETRON 2 MG/ML
4 INJECTION INTRAMUSCULAR; INTRAVENOUS
Status: COMPLETED | OUTPATIENT
Start: 2025-03-24 | End: 2025-03-24

## 2025-03-24 RX ADMIN — ONDANSETRON 4 MG: 2 INJECTION INTRAMUSCULAR; INTRAVENOUS at 07:24

## 2025-03-24 RX ADMIN — ROCURONIUM BROMIDE 20 MG: 10 INJECTION, SOLUTION INTRAVENOUS at 10:21

## 2025-03-24 RX ADMIN — FENTANYL CITRATE 50 MCG: 50 INJECTION, SOLUTION INTRAMUSCULAR; INTRAVENOUS at 09:00

## 2025-03-24 RX ADMIN — CEFAZOLIN SODIUM 2000 MG: 1 POWDER, FOR SOLUTION INTRAMUSCULAR; INTRAVENOUS at 06:57

## 2025-03-24 RX ADMIN — FENTANYL CITRATE 50 MCG: 50 INJECTION, SOLUTION INTRAMUSCULAR; INTRAVENOUS at 08:05

## 2025-03-24 RX ADMIN — SODIUM CHLORIDE, POTASSIUM CHLORIDE, SODIUM LACTATE AND CALCIUM CHLORIDE: 600; 310; 30; 20 INJECTION, SOLUTION INTRAVENOUS at 12:28

## 2025-03-24 RX ADMIN — FENTANYL CITRATE 50 MCG: 50 INJECTION, SOLUTION INTRAMUSCULAR; INTRAVENOUS at 06:58

## 2025-03-24 RX ADMIN — GLYCOPYRROLATE 0.6 MG: 0.2 INJECTION INTRAMUSCULAR; INTRAVENOUS at 10:48

## 2025-03-24 RX ADMIN — PROPOFOL 150 MG: 10 INJECTION, EMULSION INTRAVENOUS at 07:04

## 2025-03-24 RX ADMIN — SODIUM CHLORIDE, POTASSIUM CHLORIDE, SODIUM LACTATE AND CALCIUM CHLORIDE: 600; 310; 30; 20 INJECTION, SOLUTION INTRAVENOUS at 13:32

## 2025-03-24 RX ADMIN — DEXAMETHASONE SODIUM PHOSPHATE 10 MG: 10 INJECTION INTRAMUSCULAR; INTRAVENOUS at 07:24

## 2025-03-24 RX ADMIN — SODIUM CHLORIDE, POTASSIUM CHLORIDE, SODIUM LACTATE AND CALCIUM CHLORIDE: 600; 310; 30; 20 INJECTION, SOLUTION INTRAVENOUS at 07:50

## 2025-03-24 RX ADMIN — SODIUM CHLORIDE, POTASSIUM CHLORIDE, SODIUM LACTATE AND CALCIUM CHLORIDE: 600; 310; 30; 20 INJECTION, SOLUTION INTRAVENOUS at 06:56

## 2025-03-24 RX ADMIN — PROCHLORPERAZINE EDISYLATE 10 MG: 5 INJECTION INTRAMUSCULAR; INTRAVENOUS at 13:30

## 2025-03-24 RX ADMIN — FENTANYL CITRATE 50 MCG: 50 INJECTION, SOLUTION INTRAMUSCULAR; INTRAVENOUS at 09:35

## 2025-03-24 RX ADMIN — ROCURONIUM BROMIDE 30 MG: 10 INJECTION, SOLUTION INTRAVENOUS at 09:05

## 2025-03-24 RX ADMIN — SODIUM CHLORIDE, POTASSIUM CHLORIDE, SODIUM LACTATE AND CALCIUM CHLORIDE: 600; 310; 30; 20 INJECTION, SOLUTION INTRAVENOUS at 09:15

## 2025-03-24 RX ADMIN — MEPERIDINE HYDROCHLORIDE 12.5 MG: 25 INJECTION INTRAMUSCULAR; INTRAVENOUS; SUBCUTANEOUS at 11:42

## 2025-03-24 RX ADMIN — FENTANYL CITRATE 50 MCG: 50 INJECTION, SOLUTION INTRAMUSCULAR; INTRAVENOUS at 10:53

## 2025-03-24 RX ADMIN — ROCURONIUM BROMIDE 40 MG: 10 INJECTION, SOLUTION INTRAVENOUS at 07:04

## 2025-03-24 RX ADMIN — ACETAMINOPHEN: 500 TABLET ORAL at 13:17

## 2025-03-24 RX ADMIN — Medication 3 MG: at 10:48

## 2025-03-24 RX ADMIN — FENTANYL CITRATE 50 MCG: 50 INJECTION, SOLUTION INTRAMUSCULAR; INTRAVENOUS at 07:04

## 2025-03-24 RX ADMIN — MIDAZOLAM 2 MG: 1 INJECTION INTRAMUSCULAR; INTRAVENOUS at 06:56

## 2025-03-24 RX ADMIN — LIDOCAINE HYDROCHLORIDE 65 MG: 20 INJECTION, SOLUTION INTRAVENOUS at 07:04

## 2025-03-24 RX ADMIN — ONDANSETRON 4 MG: 2 INJECTION, SOLUTION INTRAMUSCULAR; INTRAVENOUS at 12:07

## 2025-03-24 ASSESSMENT — PAIN - FUNCTIONAL ASSESSMENT
PAIN_FUNCTIONAL_ASSESSMENT: ACTIVITIES ARE NOT PREVENTED
PAIN_FUNCTIONAL_ASSESSMENT: NONE - DENIES PAIN
PAIN_FUNCTIONAL_ASSESSMENT: NONE - DENIES PAIN
PAIN_FUNCTIONAL_ASSESSMENT: ACTIVITIES ARE NOT PREVENTED
PAIN_FUNCTIONAL_ASSESSMENT: 0-10
PAIN_FUNCTIONAL_ASSESSMENT: NONE - DENIES PAIN
PAIN_FUNCTIONAL_ASSESSMENT: 0-10
PAIN_FUNCTIONAL_ASSESSMENT: 0-10
PAIN_FUNCTIONAL_ASSESSMENT: NONE - DENIES PAIN
PAIN_FUNCTIONAL_ASSESSMENT: ACTIVITIES ARE NOT PREVENTED
PAIN_FUNCTIONAL_ASSESSMENT: ACTIVITIES ARE NOT PREVENTED
PAIN_FUNCTIONAL_ASSESSMENT: 0-10
PAIN_FUNCTIONAL_ASSESSMENT: 0-10
PAIN_FUNCTIONAL_ASSESSMENT: ACTIVITIES ARE NOT PREVENTED

## 2025-03-24 ASSESSMENT — PAIN SCALES - GENERAL: PAINLEVEL_OUTOF10: 8

## 2025-03-24 ASSESSMENT — PAIN DESCRIPTION - DESCRIPTORS
DESCRIPTORS: BURNING
DESCRIPTORS: ACHING;BURNING
DESCRIPTORS: ACHING;BURNING
DESCRIPTORS: BURNING

## 2025-03-24 ASSESSMENT — PAIN DESCRIPTION - ORIENTATION: ORIENTATION: RIGHT

## 2025-03-24 NOTE — PROGRESS NOTES
Scopolamine patch removed per patient and daughter request,stating she felt it made her too dry and tired.

## 2025-03-24 NOTE — PROGRESS NOTES
Taking po cookies and beverage in, states stomach is upset. Dr Fields given status report. Medicated as ordered.Daughter at patient's side.

## 2025-03-24 NOTE — ANESTHESIA POSTPROCEDURE EVALUATION
Department of Anesthesiology  Postprocedure Note    Patient: Luly Alatorre  MRN: [id number]  YOB: 1945  Date of evaluation: 3/24/2025    Procedure Summary       Date: 03/24/25 Room / Location: [address]    Anesthesia Start: 0656 Anesthesia Stop: 1056    Procedure: BILATERAL BREAST AUGMENTATION WITH PLACEMENT OF MESH (Bilateral) Diagnosis:       H/O cosmetic surgery      (H/O cosmetic surgery [Z98.890])    Surgeons: Bunny Madsen MD Responsible Provider: Juan Fields MD    Anesthesia Type: general ASA Status: 3            Anesthesia Type: No value filed.    Babs Phase I: Babs Score: 10    Babs Phase II: Babs Score: 9    Anesthesia Post Evaluation    Patient location during evaluation: PACU  Patient participation: complete - patient participated  Level of consciousness: awake  Airway patency: patent  Nausea & Vomiting: no nausea and no vomiting  Cardiovascular status: hemodynamically stable  Respiratory status: acceptable  Hydration status: stable  Pain management: adequate    No notable events documented.

## 2025-03-24 NOTE — OP NOTE
Operative Note      Patient: Luly Alatorre  YOB: 1945  MRN: [id number]    Date of Procedure: 3/24/2025    Pre-Op Diagnosis Codes:      * H/O cosmetic surgery [Z98.890]  Patient desire for cosmetic revision bilateral breast augmentation with placement of dissolvable mesh    Post-Op Diagnosis: Same       Procedure: Cosmetic revision bilateral breast augmentation with dissolvable mesh placement    Surgeon(s):  Bunny Madsen MD    Assistant:   Surgical Assistant: Sturgeon, Crystal    Anesthesia: General    Estimated Blood Loss (mL): Minimal    Complications: None    Specimens:   * No specimens in log *    Implants:  Implant Name Type Inv. Item Serial No.  Lot No. LRB No. Used Action   MESH SCAFFOLD GALAFLEX [id number] BIORESRB - [id number]  MESH SCAFFOLD GALAFLEX [id number] BIORESRB  [id number]  1 Implanted   IMPLANT BREAST GEL XTRA SMTH RND MOD HI PROF 280CC - [id number]  IMPLANT BREAST GEL XTRA SMTH RND MOD HI PROF 280CC [id number] [employer]ENT[employer] DONAV[employer]-WD  Right 1 Implanted   IMPLANT BREAST GEL XTRA SMTH RND MOD HI PROF 280CC - [id number]  IMPLANT BREAST GEL XTRA SMTH RND MOD HI PROF 280CC [id number] MENTOR DONAVON-WD  Left 1 Implanted         Drains: * No LDAs found *    Findings:  Infection Present At Time Of Surgery (PATOS) (choose all levels that have infection present):  No infection present  Other Findings: none    Detailed Description of Procedure:   This is a 79-year-old female who in the remote past had skin sparing bilateral mastectomies and implants placed for benign disease.  Over this past year, she has had 2 revision breast augmentations performed by different provider.  She presented to my office with the chief complaints of breast asymmetry and implant malposition.  She also complained that the implants were too large.  On examination this patient has multiple scars on both breasts.  It appears as though she may have had free nipple grafting.

## 2025-03-24 NOTE — H&P
HISTORY AND PHYSICAL             Date: 3/24/2025        Patient Name: Luly Alatorre     YOB: 1945      Age:  79 y.o.    Chief Complaint   Patient desire for revision bilateral breast augmentation with silicone implants and absorbable mesh placement       History Obtained From   patient    History of Present Illness   This is a 79-year-old female who complains of overly large breast implants that are asymmetric.  She desires revision and presents today for that surgery.  Previously we have discussed the plan which includes exchange of large saline implants for smaller silicone implants, capsulorrhaphies, capsulectomies, and placement of dissolvable mesh.    Past Medical History     Past Medical History:   Diagnosis Date    Arthritis     Fibromyalgia     Hearing aid worn     Hearing difficulty of both ears     Hypothyroid     Motion sickness     Osteoporosis     Pneumonia     PONV (postoperative nausea and vomiting)     Primary osteoarthritis of both first carpometacarpal joints     SCHEDULED FOR THE  RIGHT THUMB SURGERY ON 05/29/2018 AT [facility]         Past Surgical History     Past Surgical History:   Procedure Laterality Date    BACK SURGERY  07/2018    L4 L5 fusion    BREAST BIOPSY      '67,'69,'75,'90    BREAST ENHANCEMENT SURGERY Bilateral     '91 silicone implants. 2000 saline implants    BREAST ENHANCEMENT SURGERY Bilateral 08/01/2024    Removal of Bilateral Saline Breast Implants, Placement of Bilateral Saline Breast Implants performed by Trell Segal MD at [facility] [place]    BREAST SURGERY Bilateral     breast tissue removed and saline implants placed '91    BREAST SURGERY Right 10/17/2024    revision of right breast reconstruction with elevation of inframmory fold,possible right breast implant exchange performed by Trell Segal MD at [facility] OR    CHOLECYSTECTOMY      COLONOSCOPY      ENDOSCOPY, COLON, DIAGNOSTIC      EYE SURGERY Bilateral     CATARACT SURGERY    HAND

## 2025-03-24 NOTE — DISCHARGE INSTRUCTIONS
No strenuous activity.  Keep dressings clean dry and intact.  Zip up or button up tops only, no pullover tops.    For pain control take extra strength Tylenol 2 tablets every 8 hours as needed.  You may also combine the Tylenol with ibuprofen 400 mg every 8 hours as needed for pain control.    Follow-up with me tomorrow.           Infection After Surgery: Care Instructions  Overview  After surgery, an infection is always possible. It doesn't mean that the surgery didn't go well.  Because an infection can be serious, your doctor has taken steps to manage it.  Your doctor checked the infection and cleaned it if necessary. Your doctor may have made an opening in the area so that the pus can drain out. You may have gauze in the cut so that the area will stay open and keep draining. You may need antibiotics.  You will need to follow up with your doctor to make sure the infection has gone away.  Follow-up care is a key part of your treatment and safety. Be sure to make and go to all appointments, and call your doctor if you are having problems. It's also a good idea to know your test results and keep a list of the medicines you take.  How can you care for yourself at home?  Make sure your surgeon knows about the infection, especially if you saw another doctor about your symptoms.  If your doctor prescribed antibiotics, take them as directed. Do not stop taking them just because you feel better. You need to take the full course of antibiotics.  Ask your doctor if you can take an over-the-counter pain medicine, such as acetaminophen (Tylenol), ibuprofen (Advil, Motrin), or naproxen (Aleve). Be safe with medicines. Read and follow all instructions on the label.  Do not take two or more pain medicines at the same time unless the doctor told you to. Many pain medicines have acetaminophen, which is Tylenol. Too much acetaminophen (Tylenol) can be harmful.  Prop up the area on a pillow anytime you sit or lie down during the next

## 2025-06-11 ENCOUNTER — OFFICE VISIT (OUTPATIENT)
Dept: RHEUMATOLOGY | Age: 80
End: 2025-06-11
Payer: MEDICARE

## 2025-06-11 VITALS
HEART RATE: 69 BPM | DIASTOLIC BLOOD PRESSURE: 79 MMHG | WEIGHT: 104 LBS | BODY MASS INDEX: 18.43 KG/M2 | HEIGHT: 63 IN | SYSTOLIC BLOOD PRESSURE: 144 MMHG

## 2025-06-11 DIAGNOSIS — M81.0 AGE-RELATED OSTEOPOROSIS WITHOUT CURRENT PATHOLOGICAL FRACTURE: Primary | ICD-10-CM

## 2025-06-11 PROCEDURE — G8427 DOCREV CUR MEDS BY ELIG CLIN: HCPCS | Performed by: INTERNAL MEDICINE

## 2025-06-11 PROCEDURE — 1159F MED LIST DOCD IN RCRD: CPT | Performed by: INTERNAL MEDICINE

## 2025-06-11 PROCEDURE — G8419 CALC BMI OUT NRM PARAM NOF/U: HCPCS | Performed by: INTERNAL MEDICINE

## 2025-06-11 PROCEDURE — 1123F ACP DISCUSS/DSCN MKR DOCD: CPT | Performed by: INTERNAL MEDICINE

## 2025-06-11 PROCEDURE — 99203 OFFICE O/P NEW LOW 30 MIN: CPT | Performed by: INTERNAL MEDICINE

## 2025-06-11 PROCEDURE — 1036F TOBACCO NON-USER: CPT | Performed by: INTERNAL MEDICINE

## 2025-06-11 PROCEDURE — G8400 PT W/DXA NO RESULTS DOC: HCPCS | Performed by: INTERNAL MEDICINE

## 2025-06-11 PROCEDURE — 1090F PRES/ABSN URINE INCON ASSESS: CPT | Performed by: INTERNAL MEDICINE

## 2025-06-11 RX ORDER — FAMOTIDINE 10 MG/ML
20 INJECTION, SOLUTION INTRAVENOUS
OUTPATIENT
Start: 2025-06-11

## 2025-06-11 RX ORDER — DIPHENHYDRAMINE HYDROCHLORIDE 50 MG/ML
50 INJECTION, SOLUTION INTRAMUSCULAR; INTRAVENOUS
OUTPATIENT
Start: 2025-06-11

## 2025-06-11 RX ORDER — SODIUM CHLORIDE 9 MG/ML
INJECTION, SOLUTION INTRAVENOUS CONTINUOUS
OUTPATIENT
Start: 2025-06-11

## 2025-06-11 RX ORDER — ALBUTEROL SULFATE 90 UG/1
4 INHALANT RESPIRATORY (INHALATION) PRN
OUTPATIENT
Start: 2025-06-11

## 2025-06-11 RX ORDER — ONDANSETRON 2 MG/ML
8 INJECTION INTRAMUSCULAR; INTRAVENOUS
OUTPATIENT
Start: 2025-06-11

## 2025-06-11 RX ORDER — HYDROCORTISONE SODIUM SUCCINATE 100 MG/2ML
100 INJECTION INTRAMUSCULAR; INTRAVENOUS
OUTPATIENT
Start: 2025-06-11

## 2025-06-11 RX ORDER — EPINEPHRINE 1 MG/ML
0.3 INJECTION, SOLUTION, CONCENTRATE INTRAVENOUS PRN
OUTPATIENT
Start: 2025-06-11

## 2025-06-11 RX ORDER — ACETAMINOPHEN 325 MG/1
650 TABLET ORAL
OUTPATIENT
Start: 2025-06-11

## 2025-06-11 ASSESSMENT — ENCOUNTER SYMPTOMS
SHORTNESS OF BREATH: 0
DIARRHEA: 0
COUGH: 0
COLOR CHANGE: 0
ABDOMINAL PAIN: 0
VOMITING: 0
TROUBLE SWALLOWING: 0
NAUSEA: 0

## 2025-06-11 NOTE — PATIENT INSTRUCTIONS
Will start Evenity injections monthly    If the doctor is putting you on a medication that is given to you at the hospital infusion office, then please call the infusion office about appointments, office location, copays, billing, etc. Thank you!   [facility]  Phone Number: [phone]     Have labs

## 2025-06-11 NOTE — PROGRESS NOTES
Luly Alatorre 1945 is a 79 y.o. female, here for evaluation of the following chief complaint(s):  New Patient (Patient here as a new patient for age related osteoporosis. )      Assessment & Plan   ASSESSMENT/PLAN:  Luly Alatorre 1945 is a 79 y.o. female seen in consult for osteoporosis.    Assessment & Plan  1. Osteoporosis.  Her bone density is within the osteoporosis range. She has previously experienced adverse reactions to Reclast and Boniva, including severe cramps and injection site reactions. Prolia was discussed as an alternative, but she has not tried it before. Evenity was recommended as it can build bone density and is administered as a monthly injection for a year. The potential side effects of Evenity, including injection site reactions, arthralgias, and increased cardiovascular risk, were discussed. Forteo was also mentioned as an option but was not preferred due to its daily administration requirement. A prescription for Evenity will be provided, and she will receive the injections at [facility]. Her calcium and vitamin D levels will be updated before starting the treatment. If there are any changes, issues, questions, or concerns in the meantime, she should contact the office. If Evenity is not covered by her insurance, Prolia will be considered as an alternative.  Lowest T-score is -2.7 in the left femur on bone density scan from 12/9/2024    Plan:  - Update calcium and vitamin D levels with blood work.  - Start Evenity injections monthly for one year at the infusion center.  - If Evenity is not covered by insurance, consider Prolia as an alternative.  - Monitor for any side effects or adverse reactions.  - Next bone density scan will be December 2026.      Follow-up:  The patient will follow up in 6 months.       1. Age-related osteoporosis without current pathological fracture  -     Basic Metabolic Panel; Future  -     Vitamin D 25 Hydroxy;

## 2025-06-27 ENCOUNTER — HOSPITAL ENCOUNTER (OUTPATIENT)
Age: 80
Discharge: HOME | End: 2025-06-27
Payer: MEDICARE

## 2025-06-27 DIAGNOSIS — M81.0 AGE-RELATED OSTEOPOROSIS WITHOUT CURRENT PATHOLOGICAL FRACTURE: ICD-10-CM

## 2025-06-27 LAB
25(OH)D3 SERPL-MCNC: 69.7 NG/ML (ref 30–100)
ANION GAP SERPL CALCULATED.3IONS-SCNC: 10 MMOL/L (ref 7–16)
BUN SERPL-MCNC: 28 MG/DL (ref 8–23)
CALCIUM SERPL-MCNC: 9.3 MG/DL (ref 8.8–10.2)
CHLORIDE SERPL-SCNC: 104 MMOL/L (ref 98–107)
CO2 SERPL-SCNC: 27 MMOL/L (ref 22–29)
CREAT SERPL-MCNC: 0.8 MG/DL (ref 0.5–1)
GFR, ESTIMATED: 76 ML/MIN/1.73M2
GLUCOSE SERPL-MCNC: 83 MG/DL (ref 74–99)
POTASSIUM SERPL-SCNC: 4.1 MMOL/L (ref 3.5–5.1)
SODIUM SERPL-SCNC: 141 MMOL/L (ref 136–145)

## 2025-06-27 PROCEDURE — 36415 COLL VENOUS BLD VENIPUNCTURE: CPT

## 2025-06-27 PROCEDURE — 80048 BASIC METABOLIC PNL TOTAL CA: CPT

## 2025-06-27 PROCEDURE — 82306 VITAMIN D 25 HYDROXY: CPT

## 2025-06-30 ENCOUNTER — HOSPITAL ENCOUNTER (OUTPATIENT)
Dept: INFUSION THERAPY | Age: 80
Setting detail: INFUSION SERIES
Discharge: HOME | End: 2025-06-30
Payer: MEDICARE

## 2025-06-30 ENCOUNTER — RESULTS FOLLOW-UP (OUTPATIENT)
Dept: RHEUMATOLOGY | Age: 80
End: 2025-06-30

## 2025-06-30 VITALS
RESPIRATION RATE: 16 BRPM | HEART RATE: 67 BPM | SYSTOLIC BLOOD PRESSURE: 130 MMHG | TEMPERATURE: 97.7 F | DIASTOLIC BLOOD PRESSURE: 69 MMHG | OXYGEN SATURATION: 100 %

## 2025-06-30 DIAGNOSIS — M81.0 AGE-RELATED OSTEOPOROSIS WITHOUT CURRENT PATHOLOGICAL FRACTURE: Primary | ICD-10-CM

## 2025-06-30 PROCEDURE — 96372 THER/PROPH/DIAG INJ SC/IM: CPT

## 2025-06-30 PROCEDURE — 6360000002 HC RX W HCPCS: Performed by: INTERNAL MEDICINE

## 2025-06-30 RX ORDER — ONDANSETRON 2 MG/ML
8 INJECTION INTRAMUSCULAR; INTRAVENOUS
OUTPATIENT
Start: 2025-07-28

## 2025-06-30 RX ORDER — ALBUTEROL SULFATE 90 UG/1
4 INHALANT RESPIRATORY (INHALATION) PRN
OUTPATIENT
Start: 2025-07-28

## 2025-06-30 RX ORDER — DIPHENHYDRAMINE HYDROCHLORIDE 50 MG/ML
50 INJECTION, SOLUTION INTRAMUSCULAR; INTRAVENOUS
OUTPATIENT
Start: 2025-07-28

## 2025-06-30 RX ORDER — HYDROCORTISONE SODIUM SUCCINATE 100 MG/2ML
100 INJECTION INTRAMUSCULAR; INTRAVENOUS
OUTPATIENT
Start: 2025-07-28

## 2025-06-30 RX ORDER — ACETAMINOPHEN 325 MG/1
650 TABLET ORAL
OUTPATIENT
Start: 2025-07-28

## 2025-06-30 RX ORDER — SODIUM CHLORIDE 9 MG/ML
INJECTION, SOLUTION INTRAVENOUS CONTINUOUS
OUTPATIENT
Start: 2025-07-28

## 2025-06-30 RX ORDER — EPINEPHRINE 1 MG/ML
0.3 INJECTION, SOLUTION, CONCENTRATE INTRAVENOUS PRN
OUTPATIENT
Start: 2025-07-28

## 2025-06-30 RX ADMIN — ROMOSOZUMAB-AQQG 105 MG: 105 INJECTION, SOLUTION SUBCUTANEOUS at 14:18

## 2025-06-30 RX ADMIN — ROMOSOZUMAB-AQQG 105 MG: 105 INJECTION, SOLUTION SUBCUTANEOUS at 14:16

## 2025-06-30 NOTE — FLOWSHEET NOTE
Patient tolerated injections well. Remained on unit for 30 minutes after treatment. Patient alert and oriented x3. No distress noted. Vital signs stable. Patient denies any new or worsening pain. Educated patient on possible side effects and treatment of medication.     Patient verbalized understanding. Patient  given education and/or discharge material. Patient denies any needs. All questions answered. D/C in stable condition.

## 2025-07-17 DIAGNOSIS — M25.552 LEFT HIP PAIN: Primary | ICD-10-CM

## 2025-07-21 ENCOUNTER — OFFICE VISIT (OUTPATIENT)
Age: 80
End: 2025-07-21
Payer: MEDICARE

## 2025-07-21 VITALS — HEIGHT: 63 IN | BODY MASS INDEX: 18.78 KG/M2 | WEIGHT: 106 LBS

## 2025-07-21 DIAGNOSIS — M46.1 ARTHRITIS OF LEFT SACROILIAC JOINT: Primary | ICD-10-CM

## 2025-07-21 PROCEDURE — 99203 OFFICE O/P NEW LOW 30 MIN: CPT | Performed by: ORTHOPAEDIC SURGERY

## 2025-07-21 PROCEDURE — 1159F MED LIST DOCD IN RCRD: CPT | Performed by: ORTHOPAEDIC SURGERY

## 2025-07-21 PROCEDURE — 1123F ACP DISCUSS/DSCN MKR DOCD: CPT | Performed by: ORTHOPAEDIC SURGERY

## 2025-07-21 PROCEDURE — 1125F AMNT PAIN NOTED PAIN PRSNT: CPT | Performed by: ORTHOPAEDIC SURGERY

## 2025-07-21 NOTE — PROGRESS NOTES
Chief Complaint   Patient presents with    New Patient     Patient presents into the office today for an evaluation of the L hip. She states the pain started on its own for 6 months.     Hip Pain     Patient describes the pain as aching. She notes the pain is with / without motion. Patient treats the pain with Aleve as needed.  She rates the level of pain at a 6 / 10        Luly Alatorre  presents for initial evaluation of her left hip.  Started several months ago progressively worsened Aleve tends to take away the pain.  No numbness or tingling no trauma..       Past Medical History:   Diagnosis Date    Arthritis     Fibromyalgia     Hearing aid worn     Hearing difficulty of both ears     Hypothyroid     Motion sickness     Osteoporosis     Pneumonia     PONV (postoperative nausea and vomiting)     Primary osteoarthritis of both first carpometacarpal joints     SCHEDULED FOR THE  RIGHT THUMB SURGERY ON 05/29/2018 AT [facility]      Past Surgical History:   Procedure Laterality Date    BACK SURGERY  07/2018    L4 L5 fusion    BREAST BIOPSY      '67,'69,'75,'90    BREAST ENHANCEMENT SURGERY Bilateral     '91 silicone implants. 2000 saline implants    BREAST ENHANCEMENT SURGERY Bilateral 08/01/2024    Removal of Bilateral Saline Breast Implants, Placement of Bilateral Saline Breast Implants performed by Trell Segal MD at [facility] [place]    BREAST ENHANCEMENT SURGERY Bilateral 3/24/2025    BILATERAL BREAST AUGMENTATION WITH PLACEMENT OF MESH performed by Bunny Madsen MD at [facility] OR    BREAST SURGERY Bilateral     breast tissue removed and saline implants placed '91    BREAST SURGERY Right 10/17/2024    revision of right breast reconstruction with elevation of inframmory fold,possible right breast implant exchange performed by Trell Segal MD at [facility] OR    CHOLECYSTECTOMY      COLONOSCOPY      ENDOSCOPY, COLON, DIAGNOSTIC      EYE SURGERY Bilateral     CATARACT SURGERY    HAND

## 2025-07-25 ENCOUNTER — HOSPITAL ENCOUNTER (OUTPATIENT)
Age: 80
Discharge: HOME | End: 2025-07-25
Payer: MEDICARE

## 2025-07-25 LAB
25(OH)D3 SERPL-MCNC: 65.9 NG/ML (ref 30–100)
ANION GAP SERPL CALCULATED.3IONS-SCNC: 13 MMOL/L (ref 7–16)
BUN SERPL-MCNC: 30 MG/DL (ref 8–23)
CALCIUM SERPL-MCNC: 9.5 MG/DL (ref 8.8–10.2)
CHLORIDE SERPL-SCNC: 101 MMOL/L (ref 98–107)
CO2 SERPL-SCNC: 27 MMOL/L (ref 22–29)
CREAT SERPL-MCNC: 0.9 MG/DL (ref 0.5–1)
GFR, ESTIMATED: 67 ML/MIN/1.73M2
GLUCOSE SERPL-MCNC: 91 MG/DL (ref 74–99)
POTASSIUM SERPL-SCNC: 3.9 MMOL/L (ref 3.5–5.1)
SODIUM SERPL-SCNC: 140 MMOL/L (ref 136–145)

## 2025-07-25 PROCEDURE — 36415 COLL VENOUS BLD VENIPUNCTURE: CPT

## 2025-07-25 PROCEDURE — 80048 BASIC METABOLIC PNL TOTAL CA: CPT

## 2025-07-25 PROCEDURE — 82306 VITAMIN D 25 HYDROXY: CPT

## 2025-07-29 ENCOUNTER — HOSPITAL ENCOUNTER (OUTPATIENT)
Dept: INFUSION THERAPY | Age: 80
Setting detail: INFUSION SERIES
Discharge: HOME | End: 2025-07-29
Payer: MEDICARE

## 2025-07-29 VITALS
TEMPERATURE: 97.6 F | DIASTOLIC BLOOD PRESSURE: 75 MMHG | HEART RATE: 70 BPM | RESPIRATION RATE: 16 BRPM | SYSTOLIC BLOOD PRESSURE: 129 MMHG | OXYGEN SATURATION: 98 %

## 2025-07-29 DIAGNOSIS — M81.0 AGE-RELATED OSTEOPOROSIS WITHOUT CURRENT PATHOLOGICAL FRACTURE: Primary | ICD-10-CM

## 2025-07-29 PROCEDURE — 96372 THER/PROPH/DIAG INJ SC/IM: CPT

## 2025-07-29 PROCEDURE — 6360000002 HC RX W HCPCS: Performed by: INTERNAL MEDICINE

## 2025-07-29 RX ORDER — ACETAMINOPHEN 325 MG/1
650 TABLET ORAL
OUTPATIENT
Start: 2025-08-26

## 2025-07-29 RX ORDER — SODIUM CHLORIDE 9 MG/ML
INJECTION, SOLUTION INTRAVENOUS CONTINUOUS
OUTPATIENT
Start: 2025-08-26

## 2025-07-29 RX ORDER — EPINEPHRINE 1 MG/ML
0.3 INJECTION, SOLUTION, CONCENTRATE INTRAVENOUS PRN
OUTPATIENT
Start: 2025-08-26

## 2025-07-29 RX ORDER — DIPHENHYDRAMINE HYDROCHLORIDE 50 MG/ML
50 INJECTION, SOLUTION INTRAMUSCULAR; INTRAVENOUS
OUTPATIENT
Start: 2025-08-26

## 2025-07-29 RX ORDER — ALBUTEROL SULFATE 90 UG/1
4 INHALANT RESPIRATORY (INHALATION) PRN
OUTPATIENT
Start: 2025-08-26

## 2025-07-29 RX ORDER — ONDANSETRON 2 MG/ML
8 INJECTION INTRAMUSCULAR; INTRAVENOUS
OUTPATIENT
Start: 2025-08-26

## 2025-07-29 RX ORDER — HYDROCORTISONE SODIUM SUCCINATE 100 MG/2ML
100 INJECTION INTRAMUSCULAR; INTRAVENOUS
OUTPATIENT
Start: 2025-08-26

## 2025-07-29 RX ADMIN — ROMOSOZUMAB-AQQG 105 MG: 105 INJECTION, SOLUTION SUBCUTANEOUS at 14:28

## 2025-07-29 NOTE — PROGRESS NOTES
Patient tolerated evenity injections well. Remained on unit for 30 minutes after treatment. Patient alert and oriented x3. No distress noted. Vital signs stable. Patient denies any new or worsening pain. Offered patient education and/or discharge material. Patient declined. Patient denies any needs. All questions answered. D/C in stable condition.

## 2025-08-21 ENCOUNTER — HOSPITAL ENCOUNTER (OUTPATIENT)
Dept: LAB | Age: 80
Discharge: HOME | End: 2025-08-21

## 2025-08-21 ENCOUNTER — OFFICE VISIT (OUTPATIENT)
Dept: VASCULAR SURGERY | Age: 80
End: 2025-08-21
Payer: MEDICARE

## 2025-08-21 ENCOUNTER — TELEPHONE (OUTPATIENT)
Dept: RHEUMATOLOGY | Age: 80
End: 2025-08-21

## 2025-08-21 VITALS — SYSTOLIC BLOOD PRESSURE: 122 MMHG | BODY MASS INDEX: 18.95 KG/M2 | WEIGHT: 107 LBS | DIASTOLIC BLOOD PRESSURE: 70 MMHG

## 2025-08-21 DIAGNOSIS — I80.8 SUPERFICIAL THROMBOPHLEBITIS OF LEFT UPPER EXTREMITY: Primary | ICD-10-CM

## 2025-08-21 PROBLEM — G89.18 POST-OP PAIN: Status: RESOLVED | Noted: 2024-08-01 | Resolved: 2025-08-21

## 2025-08-21 PROCEDURE — 1036F TOBACCO NON-USER: CPT | Performed by: SURGERY

## 2025-08-21 PROCEDURE — 99203 OFFICE O/P NEW LOW 30 MIN: CPT | Performed by: SURGERY

## 2025-08-21 PROCEDURE — 1159F MED LIST DOCD IN RCRD: CPT | Performed by: SURGERY

## 2025-08-21 PROCEDURE — 1123F ACP DISCUSS/DSCN MKR DOCD: CPT | Performed by: SURGERY

## 2025-08-21 PROCEDURE — G8400 PT W/DXA NO RESULTS DOC: HCPCS | Performed by: SURGERY

## 2025-08-21 PROCEDURE — G8427 DOCREV CUR MEDS BY ELIG CLIN: HCPCS | Performed by: SURGERY

## 2025-08-21 PROCEDURE — 1090F PRES/ABSN URINE INCON ASSESS: CPT | Performed by: SURGERY

## 2025-08-21 PROCEDURE — G8420 CALC BMI NORM PARAMETERS: HCPCS | Performed by: SURGERY

## 2025-08-21 PROCEDURE — 1160F RVW MEDS BY RX/DR IN RCRD: CPT | Performed by: SURGERY

## 2025-08-21 RX ORDER — MONTELUKAST SODIUM 10 MG/1
10 TABLET ORAL NIGHTLY
COMMUNITY

## 2025-08-26 ENCOUNTER — HOSPITAL ENCOUNTER (OUTPATIENT)
Dept: INFUSION THERAPY | Age: 80
Setting detail: INFUSION SERIES
Discharge: HOME | End: 2025-08-26
Payer: MEDICARE

## 2025-08-26 VITALS
RESPIRATION RATE: 16 BRPM | SYSTOLIC BLOOD PRESSURE: 146 MMHG | OXYGEN SATURATION: 98 % | TEMPERATURE: 97 F | HEART RATE: 65 BPM | DIASTOLIC BLOOD PRESSURE: 71 MMHG

## 2025-08-26 DIAGNOSIS — M81.0 AGE-RELATED OSTEOPOROSIS WITHOUT CURRENT PATHOLOGICAL FRACTURE: Primary | ICD-10-CM

## 2025-08-26 PROCEDURE — 6360000002 HC RX W HCPCS: Performed by: INTERNAL MEDICINE

## 2025-08-26 PROCEDURE — 96372 THER/PROPH/DIAG INJ SC/IM: CPT

## 2025-08-26 RX ORDER — ONDANSETRON 2 MG/ML
8 INJECTION INTRAMUSCULAR; INTRAVENOUS
OUTPATIENT
Start: 2025-09-23

## 2025-08-26 RX ORDER — SODIUM CHLORIDE 9 MG/ML
INJECTION, SOLUTION INTRAVENOUS CONTINUOUS
OUTPATIENT
Start: 2025-09-23

## 2025-08-26 RX ORDER — DIPHENHYDRAMINE HYDROCHLORIDE 50 MG/ML
50 INJECTION, SOLUTION INTRAMUSCULAR; INTRAVENOUS
OUTPATIENT
Start: 2025-09-23

## 2025-08-26 RX ORDER — HYDROCORTISONE SODIUM SUCCINATE 100 MG/2ML
100 INJECTION INTRAMUSCULAR; INTRAVENOUS
OUTPATIENT
Start: 2025-09-23

## 2025-08-26 RX ORDER — EPINEPHRINE 1 MG/ML
0.3 INJECTION, SOLUTION, CONCENTRATE INTRAVENOUS PRN
OUTPATIENT
Start: 2025-09-23

## 2025-08-26 RX ORDER — ACETAMINOPHEN 325 MG/1
650 TABLET ORAL
OUTPATIENT
Start: 2025-09-23

## 2025-08-26 RX ORDER — ALBUTEROL SULFATE 90 UG/1
4 INHALANT RESPIRATORY (INHALATION) PRN
OUTPATIENT
Start: 2025-09-23

## 2025-08-26 RX ADMIN — ROMOSOZUMAB-AQQG 105 MG: 105 INJECTION, SOLUTION SUBCUTANEOUS at 15:13

## 2025-08-26 ASSESSMENT — PAIN SCALES - GENERAL: PAINLEVEL_OUTOF10: 0

## (undated) DEVICE — SYRINGE IRRIG 60ML SFT PLIABLE BLB EZ TO GRP 1 HND USE W/

## (undated) DEVICE — UNIVERSAL DRAPE: Brand: MEDLINE INDUSTRIES, INC.

## (undated) DEVICE — 3M™ STERI-STRIP™ REINFORCED ADHESIVE SKIN CLOSURES, R1548, 1 IN X 5 IN (25 MM X 125 MM), 4 STRIPS/ENVELOPE: Brand: 3M™ STERI-STRIP™

## (undated) DEVICE — DRAPE,REIN 53X77,STERILE: Brand: MEDLINE

## (undated) DEVICE — BRA SURGICALXL FULL SUPP SFT CUP FR CLSR ADJ STRP

## (undated) DEVICE — 3M™ TEGADERM™ TRANSPARENT FILM DRESSING FRAME STYLE, 1624W, 2-3/8 IN X 2-3/4 IN (6 CM X 7 CM), 100/CT 4CT/CASE: Brand: 3M™ TEGADERM™

## (undated) DEVICE — SUTURE ETHIBOND EXCEL SZ 2-0 L30IN NONABSORBABLE GRN L26MM SH X833H

## (undated) DEVICE — ELECTRODE PT RET AD L9FT HI MOIST COND ADH HYDRGEL CORDED

## (undated) DEVICE — Device: Brand: NIPRO HYPODERMIC NEEDLE

## (undated) DEVICE — SYRINGE MED 10ML TRNSLUC BRL PLUNG BLK MRK POLYPR CTRL

## (undated) DEVICE — SUTURE MONOCRYL SZ 3-0 L27IN ABSRB UD L26MM SH 1/2 CIR Y416H

## (undated) DEVICE — SPONGE LAP W18XL18IN WHT COT 4 PLY FLD STRUNG RADPQ DISP ST 2 PER PACK

## (undated) DEVICE — BLADE,STAINLESS-STEEL,10,STRL,DISPOSABLE: Brand: MEDLINE

## (undated) DEVICE — STRAP POS MP 30X3 IN HK LOOP CLOSURE FOAM DISP

## (undated) DEVICE — LIQUIBAND RAPID ADHESIVE 36/CS 0.8ML: Brand: MEDLINE

## (undated) DEVICE — TOWEL,OR,DSP,ST,BLUE,STD,6/PK,12PK/CS: Brand: MEDLINE

## (undated) DEVICE — SOLUTION IRRIG 1000ML 0.9% SOD CHL USP POUR PLAS BTL

## (undated) DEVICE — Device

## (undated) DEVICE — GARMENT COMPR STD FOR 17IN CALF UNIF THER FLOTRN

## (undated) DEVICE — ROUND MODERATE PLUS PROFILE  SALINE BREAST IMPLANT SIZER, 300CC: Brand: MENTOR ROUND MODERATE PLUS PROFILE SINGLE USE SALINE BREAST IMPLANT SIZER

## (undated) DEVICE — TOWEL,OR,DSP,ST,BLUE,DLX,10/PK,8PK/CS: Brand: MEDLINE

## (undated) DEVICE — PLASMABLADE PS210-030S 3.0S LOCK: Brand: PLASMABLADE™

## (undated) DEVICE — BANDAGE,GAUZE,4.5"X4.1YD,STERILE,LF: Brand: MEDLINE

## (undated) DEVICE — COVER,LIGHT HANDLE,FLX,2/PK: Brand: MEDLINE INDUSTRIES, INC.

## (undated) DEVICE — BLADE,STAINLESS-STEEL,15,STRL,DISPOSABLE: Brand: MEDLINE

## (undated) DEVICE — BASIC DOUBLE BASIN 2-LF: Brand: MEDLINE INDUSTRIES, INC.

## (undated) DEVICE — APPLICATOR MEDICATED 26 CC SOLUTION HI LT ORNG CHLORAPREP

## (undated) DEVICE — SYRINGE MED 50ML LUERLOCK TIP

## (undated) DEVICE — 3M™ STERI-STRIP™ REINFORCED ADHESIVE SKIN CLOSURES, R1547, 1/2 IN X 4 IN (12 MM X 100 MM), 6 STRIPS/ENVELOPE: Brand: 3M™ STERI-STRIP™

## (undated) DEVICE — SOLUTION PREP 4OZ 10% POVIDONE IOD PLAS BTL BDINE

## (undated) DEVICE — NEPTUNE E-SEP SMOKE EVACUATION PENCIL, COATED, 70MM BLADE, PUSH BUTTON SWITCH: Brand: NEPTUNE E-SEP

## (undated) DEVICE — GOWN,SIRUS,NON REINFRCD,LARGE,SET IN SL: Brand: MEDLINE

## (undated) DEVICE — BLADE ES ELASTOMERIC COAT INSUL DURABLE BEND UPTO 90DEG

## (undated) DEVICE — COUNTER NDL 10 COUNT HLD 20 FOAM BLK SGL MAG

## (undated) DEVICE — CLOTH PREP W7.5XL7.5IN 2% CHG SKIN ALC AND RNS FREE

## (undated) DEVICE — GLOVE ORANGE PI 7 1/2   MSG9075

## (undated) DEVICE — PACK,UNIVERSAL,NO GOWNS: Brand: MEDLINE

## (undated) DEVICE — BLADE ES L6IN ELASTOMERIC COAT INSUL DURABLE BEND UPTO

## (undated) DEVICE — GOWN SIRUS NONREIN XL W/TWL: Brand: MEDLINE INDUSTRIES, INC.

## (undated) DEVICE — SUTURE MONOCRYL SZ 4-0 L18IN ABSRB UD L19MM PS-2 3/8 CIR PRIM Y496G

## (undated) DEVICE — GLOVE ORANGE PI 7   MSG9070

## (undated) DEVICE — STERILE POLYISOPRENE POWDER-FREE SURGICAL GLOVES: Brand: PROTEXIS

## (undated) DEVICE — DRESSING FOAM W6.3XL7.9IN TAN SACR SELF ADH MEPILEX BORD

## (undated) DEVICE — SHEET,DRAPE,40X58,STERILE: Brand: MEDLINE

## (undated) DEVICE — SUTURE MONOCRYL SZ 3-0 L18IN ABSRB UD L19MM PS-2 3/8 CIR PRIM Y497G

## (undated) DEVICE — INTENDED FOR TISSUE SEPARATION, AND OTHER PROCEDURES THAT REQUIRE A SHARP SURGICAL BLADE TO PUNCTURE OR CUT.: Brand: BARD-PARKER SAFETY BLADES SIZE 15, STERILE

## (undated) DEVICE — ADAPTER MON OD15X22MM ID15MM STD LUER FIT W/ LIFESAVER

## (undated) DEVICE — NEEDLE HYPO 18GA L1.5IN PNK POLYPR HUB S STL REG BVL STR

## (undated) DEVICE — SYRINGE,PISTON,IRRIGATION,60ML,STERILE: Brand: MEDLINE

## (undated) DEVICE — WIPES SKIN CLOTH READYPREP 9 X 10.5 IN 2% CHLORHEX GLUCONATE CHG PREOP

## (undated) DEVICE — SYRINGE MED 10ML LUERLOCK TIP W/O SFTY DISP

## (undated) DEVICE — LIMB HOLDER, WRIST/ANKLE: Brand: DEROYAL

## (undated) DEVICE — SUTURE PDS II SZ 2-0 L27IN ABSRB VLT SH L26MM 1/2 CIR Z317H

## (undated) DEVICE — DOUBLE BASIN SET: Brand: MEDLINE INDUSTRIES, INC.

## (undated) DEVICE — YANKAUER,BULB TIP,W/O VENT,RIGID,STERILE: Brand: MEDLINE

## (undated) DEVICE — TUBING, SUCTION, 3/16" X 10', STRAIGHT: Brand: MEDLINE

## (undated) DEVICE — PEN: MARKING STD 100/CS: Brand: MEDICAL ACTION INDUSTRIES

## (undated) DEVICE — BINDER MAMM POSTSURGICAL MED 34-36 IN